# Patient Record
Sex: MALE | Race: WHITE
[De-identification: names, ages, dates, MRNs, and addresses within clinical notes are randomized per-mention and may not be internally consistent; named-entity substitution may affect disease eponyms.]

---

## 2018-09-13 ENCOUNTER — HOSPITAL ENCOUNTER (INPATIENT)
Dept: HOSPITAL 68 - ERH | Age: 63
LOS: 5 days | DRG: 426 | End: 2018-09-18
Attending: INTERNAL MEDICINE | Admitting: INTERNAL MEDICINE
Payer: COMMERCIAL

## 2018-09-13 VITALS — WEIGHT: 196 LBS | HEIGHT: 68 IN | BODY MASS INDEX: 29.7 KG/M2

## 2018-09-13 VITALS — SYSTOLIC BLOOD PRESSURE: 140 MMHG | DIASTOLIC BLOOD PRESSURE: 72 MMHG

## 2018-09-13 VITALS — SYSTOLIC BLOOD PRESSURE: 105 MMHG | DIASTOLIC BLOOD PRESSURE: 64 MMHG

## 2018-09-13 VITALS — DIASTOLIC BLOOD PRESSURE: 70 MMHG | SYSTOLIC BLOOD PRESSURE: 124 MMHG
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
## 2018-09-13 DIAGNOSIS — F05: ICD-10-CM

## 2018-09-13 DIAGNOSIS — D72.828: ICD-10-CM

## 2018-09-13 DIAGNOSIS — I10: ICD-10-CM

## 2018-09-13 DIAGNOSIS — F10.10: ICD-10-CM

## 2018-09-13 DIAGNOSIS — T50.3X5A: ICD-10-CM
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
## 2018-09-13 DIAGNOSIS — E66.9: ICD-10-CM

## 2018-09-13 DIAGNOSIS — E87.1: Primary | ICD-10-CM

## 2018-09-13 DIAGNOSIS — G89.29: ICD-10-CM

## 2018-09-13 DIAGNOSIS — F32.9: ICD-10-CM

## 2018-09-13 DIAGNOSIS — F11.20: ICD-10-CM

## 2018-09-13 DIAGNOSIS — E80.6: ICD-10-CM

## 2018-09-13 LAB
ABSOLUTE GRANULOCYTE CT: 13.8 /CUMM (ref 1.4–6.5)
BASOPHILS # BLD: 0 /CUMM (ref 0–0.2)
BASOPHILS NFR BLD: 0.1 % (ref 0–2)
EOSINOPHIL # BLD: 0 /CUMM (ref 0–0.7)
EOSINOPHIL NFR BLD: 0 % (ref 0–5)
ERYTHROCYTE [DISTWIDTH] IN BLOOD BY AUTOMATED COUNT: 12.7 % (ref 11.5–14.5)
GRANULOCYTES NFR BLD: 78.9 % (ref 42.2–75.2)
HCT VFR BLD CALC: 39.1 % (ref 42–52)
LYMPHOCYTES # BLD: 2 /CUMM (ref 1.2–3.4)
MCH RBC QN AUTO: 30.6 PG (ref 27–31)
MCHC RBC AUTO-ENTMCNC: 34.8 G/DL (ref 33–37)
MCV RBC AUTO: 88 FL (ref 80–94)
MONOCYTES # BLD: 1.6 /CUMM (ref 0.1–0.6)
PLATELET # BLD: 347 /CUMM (ref 130–400)
PMV BLD AUTO: 8.3 FL (ref 7.4–10.4)
RED BLOOD CELL CT: 4.44 /CUMM (ref 4.7–6.1)
WBC # BLD AUTO: 17.5 /CUMM (ref 4.8–10.8)

## 2018-09-13 PROCEDURE — 84133 ASSAY OF URINE POTASSIUM: CPT

## 2018-09-13 PROCEDURE — G0480 DRUG TEST DEF 1-7 CLASSES: HCPCS

## 2018-09-13 PROCEDURE — 84300 ASSAY OF URINE SODIUM: CPT

## 2018-09-13 NOTE — ED AMS/SEIZURE/WEAK/DIZZY
**See Addendum**
History of Present Illness
 
General
Chief Complaint: Altered Mental Status
Stated Complaint: BIBA WITH AMS
Source: EMS
Exam Limitations: unable to give history, confusion
 
Vital Signs & Intake/Output
Vital Signs & Intake/Output
 Vital Signs
 
 
Date Time Temp Pulse Resp B/P B/P Pulse O2 O2 Flow FiO2
 
     Mean Ox Delivery Rate 
 
09/13 2001 97.5 77 18 120/74  99 Room Air  
 
09/13 1757 97.2 62 20 118/56  98 Room Air  
 
09/13 1603 95.0 98 20 192/102  99 Room Air  
 
 
 
Allergies
Coded Allergies:
No Known Allergies (09/13/18)
 
Triage Note:
PT BIBA FROM [facility]. PT WAS FOUND
 WALKING WITHOUT SHOES AND DISORIENTED. PT
 REMAINED DISORIENTED TO SITUATION. PT ABLE TO
 STATE YEAR AND DATE BUT THOUGHT HE WAS AT HOSPITAL
 TO GET "PIZZA". PT ALSO STATED HE WANTED TO LEAVE
 BECAUSE HE WAS AT THE HOSPITAL FOR "HOURS" BUT WAS
 ONLY PRESENT FOR A FEW MINUTES. PT HYPERTENSIVE.
 PT'S SPEECH CLEAR AND APPROPRIATE.  STRENGTH
 EQUAL BILAT.
Triage Nurses Notes Reviewed? yes
Unable To Obtain Hx Due To: patient confusion
HPI:
62-year-old male with past medical history of hypertension, opioid abuse brought
in by EMS after he was found altered wandering around a grocery store with no 
shoes.  Patient is A&O 3 however he remains confused about the days events and 
is fighting staff.  He believes he has been in the hospital for a very long time
and wants to go home.  He reports that he lives with his mother in [place].  He
gave me permission to contact her and a phone number which is not a current or 
valid phone number.  He reports that he is on methadone 25 mg daily which he 
obtains from [facility].  He states he did not go to  his medication 
today.  He also states that he takes metoprolol for hypertension but has not 
taken that medication either.  Patient is clearly altered and history is 
unreliable at this time.  Patient has not been seen in [facility] before 
and no prior records are able to be obtained at this time.
(Sarath DAVILA,Jeannine)
Reconcile Medications
Aspirin/Acetaminophen/Caffeine (Excedrin Extra Strength Caplet) 250 MG-250 MG-65
MG TABLET HEADACHES  (Reported)
Bupropion HCl (Bupropion XL) 300 MG TAB.ER.24H   1 TAB PO DAILY MENTAL HEALTH  (
Reported)
Finasteride 5 MG TABLET   1 TAB PO DAILY PROSTATE  (Reported)
Metoprolol Succ XL (Toprol XL) 100 MG TAB.ER.24H   1 TAB PO DAILY HEART/BP  (
Reported)
Naproxen Sodium (Aleve) 220 MG TABLET PAIN/INFLAMMATION  (Reported)
Simethicone (Gas-X) 125 MG CAPSULE   1 CAP PO AD PRN GAS  (Reported)
Tamsulosin HCl (Flomax) 0.4 MG CAP.ER.24H   1 CAP PO DAILY /PROSTATE  (
Reported)
 
(Tong Sun DO)
 
Past History
 
Travel History
Traveled to [place] past 21 day No
 
Medical History
Neurological: NONE
EENT: NONE
Cardiovascular: hypertension
Respiratory: NONE
Gastrointestinal: NONE
Hepatic: NONE
Renal: NONE
Musculoskeletal: NONE
Psychiatric: substance abuse
Endocrine: NONE
Blood Disorders: NONE
Cancer(s): NONE
 
Psychosocial History
Who do you live with Mother
Services at Home None
What is your primary language English
Tobacco Use: Quit >30 days ago
ETOH Use: denies use
Illicit Drug Use: denies illicit drug use
 
Family History
Hx Contributory? No
(Jeannine Clemens MD)
 
Medical History
Any Pertinent Medical History? see below for history
 
Surgical History
Surgical History: unobtainable
(Tong Sun DO)
 
Review of Systems
 
Review of Systems
Constitutional:
Reports: no symptoms. 
EENTM:
Reports: see HPI. 
Respiratory:
Reports: see HPI. 
Cardiovascular:
Reports: see HPI. 
GI:
Reports: see HPI. 
Genitourinary:
Reports: see HPI. 
Musculoskeletal:
Reports: see HPI. 
Skin:
Reports: see HPI. 
Neurological/Psychological:
Reports: see HPI. 
(Jeannine Clemens MD)
 
Physical Exam
 
Physical Exam
General Appearance: alert, awake, anxious, severe distress, obese, patient in 
four point restraints as he was agitated and unwilling to stay in bed or room.  
He would not follow commands of staff.
Head: atraumatic, normal appearance
Eyes:
Bilateral: normal appearance. 
Neck: normal inspection, supple
Respiratory: normal breath sounds, chest non-tender, no respiratory distress
Cardiovascular: regular rate/rhythm
Gastrointestinal: normal bowel sounds, soft, non-tender
Extremities: pedal edema
Skin: warm/dry
(Jeannine Clemens MD)
 
Core Measures
ACS in differential dx? No
CVA/TIA Diagnosis No
Sepsis Present: No
Sepsis Focused Exam Completed? No
(Dino DO,Tong L.)
 
Progress
Differential Diagnosis: alcohol intoxication, dehydration, drug intoxication, 
hypoxia, intracranial Hem., UTI/pyelo
Plan of Care:
 Orders
 
 
Procedure Date/time Status
 
Nothing by Mouth 09/14 B Active
 
VRE ACTIVE SURVIELLANCE 09/13 2030 Active
 
ACTIVE SURVEILLANCE NARES 09/13 2030 Active
 
Pathway - chart 09/13 2026 Active
 
House Staff 09/13 2026 Active
 
Code Status 09/13 2026 Active
 
Pathway - chart 09/13 1957 Active
 
Weight 09/13 1943 Active
 
Vital Signs 09/13 1943 Active
 
Turn and Reposition 09/13 1943 Active
 
Teach/Educate 09/13 1943 Active
 
Skin Integrity Protocol 09/13 1943 Active
 
Skin/Pressure Ulcer Assess (Sk 09/13 1943 Active
 
Precautions 09/13 1943 Active
 
Pain Treatment and Response 09/13 1943 Active
 
Nutritional Intake, Monitor 09/13 1943 Active
 
Isolation 09/13 1943 Active
 
CIWA 09/13 1943 Active
 
Patient Care Conference 09/13 1943 Active
 
Activity/Ambulation 09/13 1943 Active
 
SODIUM 09/13 1929 Complete
 
URINE DRUGS OF ABUSE 09/13 1912 Complete
 
Restraint- Discontinue 09/13 1900 Active
 
Restraint- Behavioral (Order) 09/13 1817 Active
 
Patient Data 09/13 1813 Active
 
Add-on Test (ER Only) 09/13 1748 Active
 
CULTURE,URINE 09/13 1748 Active
 
URINE OSMOLALITY 09/13 1748 Complete
 
URINE LYTES, SPOT 09/13 1748 Complete
 
URINALYSIS 09/13 1748 Complete
 
BLOOD CULTURE 09/13 1747 Active
 
ED Holding Orders 09/13 1745 Active
 
Admit to inpatient 09/13 1745 Active
 
Vital Signs 09/13 1745 Active
 
Code Status 09/13 1745 Complete
 
Restraint- Behavioral (Order) 09/13 1637 Active
 
ACETOMINOPHEN 09/13 1635 Complete
 
TROPONIN LEVEL 09/13 1635 Complete
 
SALICYLATE 09/13 1635 Complete
 
ETHANOL 09/13 1635 Complete
 
COMPREHENSIVE METABOLIC PANEL 09/13 1635 Complete
 
CBC WITHOUT DIFFERENTIAL 09/13 1635 Complete
 
EKG 09/13 1635 Active
 
Intake & Output 09/13 1557 Active
 
VTE Mechanical Prophylaxis 09/13  UNK Active
 
Vital Signs 09/13  UNK Active
 
Continuous Observation Monitor 09/13  UNK Active
 
 
 Current Medications
 
 
  Sig/Yoshi Start time  Last
 
Medication Dose  Stop Time Status Admin
 
Sodium Chloride 1,000 ML .Q24H 09/13 2045 CAN 
 
(Normal Saline 0.9%)     
 
Sodium Chloride 1,000 ML .Q24H 09/13 2030 AC 
 
(Normal Saline 0.9%)     
 
Lorazepam 2 MG ONE ONE 09/13 1715 CAN 
 
(Ativan)   09/13 1716  
 
 
 Laboratory Tests
 
 
 
09/13/18 1932:
 
 
09/13/18 1912:
Urine Opiates Screen 2377.00  H, Methadone Screen > 735  H, Barbiturate Screen <
60, Ur Phencyclidine Scrn < 6.00, Amphetamines Screen < 100, U Benzodiazepines 
Scrn < 85, Urine Cocaine Screen < 50, Urine Cannabis Screen < 5.00, Urine Color 
YEL, Urine Clarity CLEAR, Urine pH 7.0, Ur Specific Gravity 1.010, Urine Protein
NEG, Urine Ketones TRACE  H, Urine Nitrite NEG, Urine Bilirubin NEG, Urine 
Urobilinogen 0.2, Ur Leukocyte Esterase NEG, Ur Microscopic SEDIMENT EXAMINED, 
Urine RBC RARE, Urine WBC RARE, Ur Epithelial Cells RARE, Urine Mucus RARE, 
Urine Hemoglobin TRACE-INTACT  H, Urine Glucose NEG
 
09/13/18 1912:
Methadone Screen Cancelled, Barbiturate Screen Cancelled, Ur Phencyclidine Scrn 
Cancelled, Amphetamines Screen Cancelled, U Benzodiazepines Scrn Cancelled, 
Urine Cocaine Screen Cancelled, Urine Cannabis Screen Cancelled, Urine 
Osmolality 218  L, Ur Random Creatinine 30.7, Ur Random Sodium 30, Ur Random 
Potassium 24.9, Fraction Sodium Excret 0.5
 
09/13/18 1641:
Anion Gap 11, Estimated GFR > 60, BUN/Creatinine Ratio 18.3, Glucose 96, Calcium
9.0, Total Bilirubin 1.6  H, AST 79  H, ALT 46, Alkaline Phosphatase 73, 
Troponin I < 0.01, Total Protein 6.9, Albumin 4.3, Globulin 2.6, Albumin/
Globulin Ratio 1.7, CBC w Diff NO MAN DIFF REQ, RBC 4.44  L, MCV 88.0, MCH 30.6,
MCHC 34.8, RDW 12.7, MPV 8.3, Gran % 78.9  H, Lymphocytes % 11.6  L, Monocytes %
9.4  H, Eosinophils % 0, Basophils % 0.1, Absolute Granulocytes 13.8  H, 
Absolute Lymphocytes 2.0, Absolute Monocytes 1.6  H, Absolute Eosinophils 0, 
Absolute Basophils 0, Salicylates 3.0, Acetaminophen < 10.0  L, Serum Alcohol < 
10.0
 Microbiology
09/13 2030  UPPER RESP: Surveillance Culture - ORD
09/13 2030  GI: Surveillance Culture - ORD
09/13 1912  URINE ROUT: Urine Culture - RECD
09/13 1800  BLOOD: Blood Culture - RECD
09/13 1746  BLOOD: Blood Culture - RECD
 
 
Comments:
62 year old male found altered outside a grocery store barefoot.  Will order the
following:
CBC, CMP, ASA level, Tylenol level, EKG, trop, Utox, Etoh level.  Patient in 4 
point restraints placed at 16:05.  Will need to sedate to obtain CT head to rule
out acute head injury.  No contacts available or known at this time to obtain 
history.
(Jeannine Clemens MD)
Initial ED EKG: NSR
(Tong Sun DO)
 
Departure
 
Departure
Condition: Stable
Departure Forms:
Customer Survey
General Discharge Information
(Sarath DAVILA,Jeannine)
 
Departure
Disposition: STILL A PATIENT
Clinical Impression
Primary Impression: Hyponatremia
Secondary Impressions: Altered mental status
 
Admission Note
Spoke With:
Solo DAVILA,Hermila
Documentation of Exam:
Documentation of any treatments & extenuating circumstances including Concerns 
Regarding Discharge (functional status, medication knowledge or non-compliance, 
living conditions, etc.) that warrant an admission rather than observation: [The
patient needs admission for neurological evaluations every 4 hours, ICU level 
care, strict I's and O's, correction of hyponatremia, nephrology consultation.  
Nephrology was paged and informed that the patient was being admitted to the 
ICU.
 
(Tong Sun DO)

## 2018-09-13 NOTE — CT SCAN REPORT
EXAMINATION:
CT HEAD WITHOUT CONTRAST
 
CLINICAL INFORMATION:
Altered mental status.
 
COMPARISON:
No relevant prior imaging.
 
TECHNIQUE:
Contiguous axial imaging was performed from the skull base to vertex without
intravenous administration of contrast.
 
DLP:
1642.88 mGy-cm
 
FINDINGS:
There is no acute intracranial hemorrhage or abnormal extra-axial collection.
No intracranial mass effect or midline shift. Lateral and third ventricles
are proportionate to the subarachnoid spaces. No hydrocephalus. Gray-white
matter differentiation is grossly preserved and there is no evidence of acute
territorial infarct. The calvarium and skull base are intact. Mastoid air
cells and middle ear cavities are well aerated. Visualized paranasal sinuses
are well aerated.
 
IMPRESSION:
Unremarkable brain MRI. No evidence acute territorial infarct or hemorrhage.

## 2018-09-13 NOTE — HISTORY & PHYSICAL
Dez Gómez 09/13/18 1825:
General Information and HPI
MD Statement:
I have seen and personally examined SKY DAVISON and documented this H&P.
 
The patient is a 62 year old M who presented with a patient stated chief 
complaint of [altered mental status].
 
Source of Information: old records, EMS, ed staff
Exam Limitations: unable to give history, poor historian
History of Present Illness:
62-year-old gentleman past medical history significant for hypertension, opioid 
abuse brought in by ambulance after police was called when he was found 
wandering around a grocery store without any shoes on. Patient has not been seen
in [facility] before and no prior records are able to be obtained at this 
time.
 
On my interview interview patient was incomprehensible however was noted to be 
agitated as well.
 
Allergies/Medications
Allergies:
Coded Allergies:
No Known Allergies (09/13/18)
 
Home Med list
Aspirin/Acetaminophen/Caffeine (Excedrin Extra Strength Caplet) 250 MG-250 MG-65
MG TABLET HEADACHES  (Reported)
Bupropion HCl (Bupropion XL) 300 MG TAB.ER.24H   1 TAB PO DAILY MENTAL HEALTH  (
Reported)
Finasteride 5 MG TABLET   1 TAB PO DAILY PROSTATE  (Reported)
Metoprolol Succ XL (Toprol XL) 100 MG TAB.ER.24H   1 TAB PO DAILY HEART/BP  (
Reported)
Naproxen Sodium (Aleve) 220 MG TABLET PAIN/INFLAMMATION  (Reported)
Simethicone (Gas-X) 125 MG CAPSULE   1 CAP PO AD PRN GAS  (Reported)
Tamsulosin HCl (Flomax) 0.4 MG CAP.ER.24H   1 CAP PO DAILY /PROSTATE  (
Reported)
 
Compliance With Home Meds: UNKNOWN
 
Past History
 
Travel History
Traveled to [place] past 21 day No
 
Medical History
Neurological: NONE
EENT: NONE
Cardiovascular: hypertension
Respiratory: NONE
Gastrointestinal: NONE
Hepatic: NONE
Renal: NONE
Musculoskeletal: NONE
Psychiatric: substance abuse
Endocrine: NONE
Blood Disorders: NONE
Cancer(s): NONE
 
Surgical History
Surgical History: unobtainable
 
Past Family/Social History
 
Psychosocial History
Services at Home: None
ETOH Use: denies use
Illicit Drug Use: denies illicit drug use
 
Review of Systems
 
Review of Systems
Constitutional:
Denies: see HPI. 
 
Exam & Diagnostic Data
Last 24 Hrs of Vital Signs/I&O
 Vital Signs
 
 
Date Time Temp Pulse Resp B/P B/P Pulse O2 O2 Flow FiO2
 
     Mean Ox Delivery Rate 
 
09/13 2001 97.5 77 18 120/74  99 Room Air  
 
09/13 1757 97.2 62 20 118/56  98 Room Air  
 
09/13 1603 95.0 98 20 192/102  99 Room Air  
 
 
 Intake & Output
 
 
 09/13 1600 09/13 0800 09/13 0000
 
Intake Total   
 
Output Total   
 
Balance   
 
    
 
Patient 220 lb  
 
Weight   
 
 
 
 
Physical Exam
General Appearance Mild Distress, uncooperative, unkept
Skin bruising noted on arms, ? track marks on right cubital area
HEENT PERRLA, Mucous Membr. moist/pink, small pupils
Lymphatic Cervical nl
Cardiovascular Regular Rate, Normal S1, Normal S2
Lungs Normal Air Movement
Abdomen Normal Bowel Sounds, Soft, No Tenderness
Extremities No Edema
Last 24 Hrs of Labs/South:
 Laboratory Tests
 
09/13/18 1932:
 
 
09/13/18 1912:
Urine Opiates Screen 2377.00  H, Methadone Screen > 735  H, Barbiturate Screen <
60, Ur Phencyclidine Scrn < 6.00, Amphetamines Screen < 100, U Benzodiazepines 
Scrn < 85, Urine Cocaine Screen < 50, Urine Cannabis Screen < 5.00, Urine Color 
YEL, Urine Clarity CLEAR, Urine pH 7.0, Ur Specific Gravity 1.010, Urine Protein
NEG, Urine Ketones TRACE  H, Urine Nitrite NEG, Urine Bilirubin NEG, Urine 
Urobilinogen 0.2, Ur Leukocyte Esterase NEG, Ur Microscopic SEDIMENT EXAMINED, 
Urine RBC RARE, Urine WBC RARE, Ur Epithelial Cells RARE, Urine Mucus RARE, 
Urine Hemoglobin TRACE-INTACT  H, Urine Glucose NEG
 
09/13/18 1912:
Methadone Screen Cancelled, Barbiturate Screen Cancelled, Ur Phencyclidine Scrn 
Cancelled, Amphetamines Screen Cancelled, U Benzodiazepines Scrn Cancelled, 
Urine Cocaine Screen Cancelled, Urine Cannabis Screen Cancelled, Urine 
Osmolality 218  L, Ur Random Creatinine 30.7, Ur Random Sodium 30, Ur Random 
Potassium 24.9, Fraction Sodium Excret 0.5
 
09/13/18 1641:
Anion Gap 11, Estimated GFR > 60, BUN/Creatinine Ratio 18.3, Glucose 96, Calcium
9.0, Total Bilirubin 1.6  H, AST 79  H, ALT 46, Alkaline Phosphatase 73, 
Troponin I < 0.01, Total Protein 6.9, Albumin 4.3, Globulin 2.6, Albumin/
Globulin Ratio 1.7, CBC w Diff NO MAN DIFF REQ, RBC 4.44  L, MCV 88.0, MCH 30.6,
MCHC 34.8, RDW 12.7, MPV 8.3, Gran % 78.9  H, Lymphocytes % 11.6  L, Monocytes %
9.4  H, Eosinophils % 0, Basophils % 0.1, Absolute Granulocytes 13.8  H, 
Absolute Lymphocytes 2.0, Absolute Monocytes 1.6  H, Absolute Eosinophils 0, 
Absolute Basophils 0, Salicylates 3.0, Acetaminophen < 10.0  L, Serum Alcohol < 
10.0
 Microbiology
09/13 2030  UPPER RESP: Surveillance Culture - ORD
09/13 2030  GI: Surveillance Culture - ORD
09/13 1912  URINE ROUT: Urine Culture - RECD
09/13 1800  BLOOD: Blood Culture - RECD
09/13 1746  BLOOD: Blood Culture - RECD
 
 
 
Diagnostic Data
EKG Results
NSR HR 64, QTc 454
type 1 HB
CXR Results
SERVICE DATE: 09/13/
EXAM TYPE: RAD - XRY-PORTABLE CHEST XRAY
FINDINGS:
Both lungs are well-expanded and clear of acute process. The heart size and
pulmonary vascularity is normal. No gross bony abnormality seen.
 
IMPRESSION:
Unremarkable chest exam.
 
Other Results
SERVICE DATE: 09/13/
EXAM TYPE: CAT - CT HEAD WO IV CONTRAST
 
FINDINGS:
There is no acute intracranial hemorrhage or abnormal extra-axial collection.
No intracranial mass effect or midline shift. Lateral and third ventricles
are proportionate to the subarachnoid spaces. No hydrocephalus. Gray-white
matter differentiation is grossly preserved and there is no evidence of acute
territorial infarct. The calvarium and skull base are intact. Mastoid air
cells and middle ear cavities are well aerated. Visualized paranasal sinuses
are well aerated.
 
IMPRESSION:
Unremarkable brain MRI. No evidence acute territorial infarct or hemorrhage.
 
 
Assessment/Plan
Assessment:
62-year-old gentleman past medical history significant for hypertension, 
substance abuse disorder on methadone brought in altered by police.  In ED was 
found to be hyponatremic to 118 with leukocytosis of 17.5 with no bandemia.  UA 
clear.  U tox significant for opioids and methadone. 
CT head shows no acute intracranial hemorrhage or abnormal extra-axial 
collection. No intracranial mass effect or midline shift.  Chest x-ray 
unremarkable
 
Problem list:
hypotonic Hyponatremia
Altered mental status
Leukocytosis, unclear etiology likely reactive, currently afebrile
Substance abuse disorder
 
 
 
Plan:
Admit to ICU, vitals per protocol will try to increase sodium by 0.5 mL/h (4-6/3
hours)
Keep n.p.o. for now holding all his medications, please reassess in the morning
There is no next of kin contact for him in our system
Initially wanted to start the patient on hypertonic solution.  However patient 
got normal saline in the ED his sodium corrected to 122.  Will start normal 
saline KVO, once he is more awake will place him on 800 mL fluid restriction
Clinically looks euvolemic,  serum osmolality 259, with low urine osmolality 218
, urine sodium 30, feNa of 0.5, hypouricemia, hypotonic hyponatremia likely 
secondary to SIADH ,review of his medications show no clear culprit. There has 
been some case reports of bupropion causing hyponatremia.
Will hold all medications for now, please confirm his medications in the morning
Regarding his leukocytosis patient is afebrile right now will follow up with 
antibiotics if he spikes a fever will cover him empirically.
DVT prophylaxis subcu Lovenox
Patient is full code please reassess CODE STATUS when he is more awake
 
As Ranked By This Provider
Problem List:
 1. Hyponatremia
 
 2. Altered mental status
 
 
Core Measures/Misc (9/17)
 
Acute Coronary Syndrome
ACS Diagnosis: No
 
Congestive Heart Failure
Congestive Heart Failure Diagnosis No
 
Cerebrovascular Accident
CVA/TIA Diagnosis: No
 
VTE (View Protocol)
VTE Risk Factors Acute Medical Illness
No Mechanical VTE Prophylaxis d/t N/A MechProphylax Ordered
No VTE Pharm Prophylaxis d/t NA PharmProphylax ordered
 
Sepsis (View protocol)
Sepsis Present: No
If YES complete Sepsis Event Note If YES complete Sepsis Event Note
 
 
Martita DAVILA,MIKA Boyd 09/13/18 1852:
Core Measures/Misc (9/17)
 
Sepsis (View protocol)
If YES complete Sepsis Event Note If YES complete Sepsis Event Note
 
Attending MD Review Statement
 
Attending Statement
Attending MD Statement: examined this patient, discuss w/resident/PA/NP, agreed 
w/resident/PA/NP, reviewed EMR data (avail), reviewed images, amended to note
Attending Assessment/Plan:
Briefly, the patient is a 62-year-old male with past medical history significant
for hypertension and opioid abuse.  The patient was brought in by EMS after he 
was found altered wandering in a store with no shoes.  The patient was agitated 
in the ED and required restraints.  In the ED, the patient was not able to give 
a clear history.  It was reported that he takes methadone 25 mg daily which he 
obtains from [facility] but had not gone to take his medication today. 
He also takes metoprolol for hypertension but had not taken that medication 
either.  The patient was further evaluated in the emergency room, noting he had 
a white blood cell count of 17,000 and a serum sodium of 118.  His total 
bilirubin was 1.6.  The patient was initially hypertensive which readily 
improved.  The patient was very agitated and required restraint, noting he was 
given Ativan and Benadryl with improvement in his agitation.  A chest x-ray was 
done and was unremarkable.  The patient's case was discussed with nephrology.  
He is currently receiving hypertonic saline.  The cause of his hyponatremia 
remains unclear, and the complete workup as recommended by nephrology is 
pending.  The patient has been planned cultured.  A urinalysis has been ordered 
and is pending.  We will start the patient on antibiotics if there is any source
of infection identified.
 
 
 
Plan:
* Hypertonic saline administration as recommended by nephrology.
* Will replace serum sodium until CNS symptoms improve.
* Electrolytes will be monitored every 4 hours, we will avoid rapid correction 
of sodium.
* Follow-up urinalysis results.
* Follow-up cultures.
* Will start empiric antibiotics if the urinalysis is abnormal or if there is 
any evidence of infection.
* Check a right upper quadrant ultrasound.
* Please ensure the toxicology screen is completed.
* Check a head CT if able.
* Check a prolactin level.
* Monitor for seizure activity/neurological events.
* DVT prophylaxis with Venodyne's at all times until head CT is checked.
* Monitor closely in the critical care unit.
* I discussed the plan of care with the housestaff in detail.

## 2018-09-14 VITALS — DIASTOLIC BLOOD PRESSURE: 70 MMHG | SYSTOLIC BLOOD PRESSURE: 130 MMHG

## 2018-09-14 VITALS — SYSTOLIC BLOOD PRESSURE: 141 MMHG | DIASTOLIC BLOOD PRESSURE: 64 MMHG

## 2018-09-14 VITALS — SYSTOLIC BLOOD PRESSURE: 136 MMHG | DIASTOLIC BLOOD PRESSURE: 81 MMHG

## 2018-09-14 VITALS — SYSTOLIC BLOOD PRESSURE: 124 MMHG | DIASTOLIC BLOOD PRESSURE: 70 MMHG

## 2018-09-14 VITALS — SYSTOLIC BLOOD PRESSURE: 120 MMHG | DIASTOLIC BLOOD PRESSURE: 76 MMHG

## 2018-09-14 VITALS — DIASTOLIC BLOOD PRESSURE: 80 MMHG | SYSTOLIC BLOOD PRESSURE: 146 MMHG

## 2018-09-14 VITALS — SYSTOLIC BLOOD PRESSURE: 138 MMHG | DIASTOLIC BLOOD PRESSURE: 82 MMHG

## 2018-09-14 VITALS — DIASTOLIC BLOOD PRESSURE: 78 MMHG | SYSTOLIC BLOOD PRESSURE: 155 MMHG

## 2018-09-14 LAB
ABSOLUTE GRANULOCYTE CT: 8.1 /CUMM (ref 1.4–6.5)
BASOPHILS # BLD: 0.1 /CUMM (ref 0–0.2)
BASOPHILS NFR BLD: 0.4 % (ref 0–2)
EOSINOPHIL # BLD: 0 /CUMM (ref 0–0.7)
EOSINOPHIL NFR BLD: 0.2 % (ref 0–5)
ERYTHROCYTE [DISTWIDTH] IN BLOOD BY AUTOMATED COUNT: 12.9 % (ref 11.5–14.5)
GRANULOCYTES NFR BLD: 69.5 % (ref 42.2–75.2)
HCT VFR BLD CALC: 39 % (ref 42–52)
LYMPHOCYTES # BLD: 1.9 /CUMM (ref 1.2–3.4)
MCH RBC QN AUTO: 30.5 PG (ref 27–31)
MCHC RBC AUTO-ENTMCNC: 34.1 G/DL (ref 33–37)
MCV RBC AUTO: 89.5 FL (ref 80–94)
MONOCYTES # BLD: 1.5 /CUMM (ref 0.1–0.6)
PLATELET # BLD: 303 /CUMM (ref 130–400)
PMV BLD AUTO: 7.9 FL (ref 7.4–10.4)
RED BLOOD CELL CT: 4.36 /CUMM (ref 4.7–6.1)
WBC # BLD AUTO: 11.7 /CUMM (ref 4.8–10.8)

## 2018-09-14 NOTE — EVENT NOTE
Event Note
Event Note:
[facility] apparently is estranged from his brother, he does not know his contact 
information
Patient's therapist is Tara, located in [place]  ph: [phone]

## 2018-09-14 NOTE — ADMISSION CERTIFICATION
Admission Certification
 
Certification Statement
- As attending physician, I certify that at the time of
- admission, based on clinical presentation, severity of
- symptoms, need for further diagnostic testing and
- therapeutic interventions, and risk of adverse outcomes
- without in-hospital treatment, in my clinical assessment,
- this patient requires an acute hospital stay for a minimum
- of two nights or longer. I have also considered psychsocial
- factors such as support system, advanced age, financial
- issues, cognitive issues, and failed out-patient treatments,
- past re-admission history, safety of patient, and lack of
- compliance as applicable.
Specific rationale supporting this admission is:
Acute mental status change and confusion, severe hyponatremia which requires 
hypertonic saline, IV fluid management, and careful electrolyte management.  The
patient will require ICU monitoring for his critical illness.

## 2018-09-14 NOTE — PN- RESIDENT CRCU
Subjective
HPI/CRCU Issues:
Hyponatremia (118 on admission) s/p hypertonic saline
 
24 Hour Events:
Overnight patient was agitated, attempted to leave; order #7 was called and 
patient was escorted back to bed, 1:1 sitter ordered. Was in restraints in ED, 
off restraints at this time. Patient has poor insight into diagnosis, continues 
to ask why he is here and saying he wants to leave. States that he was in 
[facility] in the beginning of the week for "appendicitis" although did
not have an operation, was "unable to eat or drink anything" for two days and 
was discharged on Tuesday without follow up or medications. States that last 
night he was in a hotel with his girlfriend and apparently she wanted pizza, so 
he went out in his boxers without shoes on to get pizza. Unsure as to why police
found him rummaging through cars, states that "that never happened". Per 
discussion with nursing, patient states that "I was not rummaging through cars, 
I just got into the wrong subaru". Patient states that he feels as though his 
sodium is fine and continues to ask to leave, educated on risks of hyponatremia 
and states he will stay. 
 
His sodium has increased from 118-128, and was placed on D5W to avoid 
overcorrection.
 
Objective
 
Vital Signs & I&O
Last 8 Hrs of Vitals and I&O:
 Intake & Output
 
 
  0800
 
Intake Total 179
 
Output Total 1000
 
Balance -821
 
  
 
Intake, IV 59
 
Intake, Oral 120
 
Number 0
 
Bowel 
 
Movements 
 
Output, Urine 1000
 
 
 
 
Exam
General Appearance: well developed/nourished, no apparent distress, alert, awake
, comfortable
Head: atraumatic
Ears, Nose, Throat: normal pharynx
Respiratory: normal breath sounds, lungs clear
Cardiovascular: regular rate/rhythm, normal peripheral pulses
Gastrointestinal: soft, non-tender
Extremities: normal inspection
Cranial Nerves: normal speech
Skin: intact
Skin Temp/Moisture Exam: Warm/Dry
Sepsis Skin Exam (color): Normal for Ethnicity
Current Medications:
 Current Medications
 
 
  Sig/Yoshi Start time  Last
 
Medication Dose Route Stop Time Status Admin
 
Dextrose/Water 1,000 ML Q8H  0730 AC 
 
  IV   0750
 
Diphenhydramine HCl 50 MG ONCE ONE  1730 DC 
 
  IM  1731  1732
 
Diphenhydramine HCl 0 .STK-MED ONE  1729 DC 
 
  .ROUTE   
 
Lorazepam 2 MG ONE ONE  1730 DC 
 
  IV  1731  1732
 
Lorazepam 2 MG ONE ONE  1715 CAN 
 
  IV  1716  
 
Lorazepam 0 .STK-MED ONE  1713 DC 
 
  .ROUTE   
 
Methadone HCl 25 MG DAILY  09 AC 
 
  PO   1000
 
Sodium Chloride 1,000 ML .Q24H  2045 CAN 
 
  IV   
 
Sodium Chloride 1,000 ML .Q24H  2030 DC 
 
  IV   2100
 
Sodium Chloride 500 ML ONCE ONE  1845 DC 
 
  IV  0059  1945
 
Sodium Chloride 1,000 ML Q10H  1715 DC 
 
  IV   1732
 
 
 
 
Impression/Plan
 
Impression/Problem List
Impression:
Mr Sood is a 62M with a PMH of polysubstance abuse on Methadone 25mg daily, 
HTN brought in by police in altered state, found to have sodium of 118 on 
admission and confusion and aggression, given these findings thought to be 
symptomatic hyponatremia and so brought to ICU for hypertonic saline 
administration, now with sodium of 128 on D5W to prevent overcorrection.
 
Respiratory: stable
-Stable on room air
 
Infectious Disease:
-Did have leukocytosis on admission trending down, afebrile, likely reactive
-Will monitor off abx; cultures were drawn on admission 
-If febrile or symptomatic will start abx
 
Cardiovascular/Circulation: stable
-NSR+SB on monitor, blood pressures stable
 
Hematological: stable
-H/H 13.3/39.0
 
Metabolic: hypotonic hyponatremia
-Sodium at 131 at 10:00am, D5W rate increased
-Pending 2pm sodium, sodium q4
-Nephro recs appreciated
-Mild transaminitis will monitor with ICU bundle, scheduled for RUQ ultrasound 
but cancelled due to patient eating; will plan on tomorrow AM potential NPO 
overnight
-Tox positive for opiates, methadone, serum alcohol negative on admission
-Restarted on his methadone dose 25mg, called [facility] in 
[place] and spoke with patients nurse who administers his dose; last dose was
on the . Patient had claimed he was taking 45mg of methadone.
-Per [place], patient has large amount of benzos prescribed (Lorazepam 2mg 120 
pills for 30 days), will monitor for withdrawal
 
 
Alimentary:
-Regular diet, fluid restricted
 
 
Neurologic:
-Intact, on 1:1 sitter
-Occasionally agitated, but has not been on restraints today
-Psych consult placed given bizarre patient behavior and patient following 
outpatient psychiatrist, tianna dickson
 
Nephro:
-Stable
 
We are currently holding patient meds, will call patients pharmacy to confirm 
meds and restart tomorrow.
 
 
DVT PPX
IV access
Full Code
Problem List:
 1. Hyponatremia
 
 2. Altered mental status
 
Pain Ratin
Tomorrow's Labs & Rationales:
CBC, BEP
 
Plan
DVT/Prophylaxis: mechanical

## 2018-09-14 NOTE — CONS- NEPHROLOGY
General Information and HPI
 
Consulting Request
Date of Consult: 09/14/18
Requested By:
MIKA Anders MD
 
Reason for Consult:
Hyponatremia
Source of Information: patient
Exam Limitations: no limitations
History of Present Illness:
 
I have been asked to see this 62-year-old man because of hyponatremia.  He has a
background that includes hypertension and opioid abuse, and was brought in 
yesterday by ambulance after being found wandering around a grocery store while 
not wearing any shoes.  He was admitted because of altered mental status that 
included agitation and confusion in the setting of severe hyponatremia with a 
serum sodium of 118.  He was treated with hypertonic saline and over the next 13
hours his serum sodium ranjan to 128.  He was then given IV D5W and subsequent 
serum sodium levels were 131 and then 130.  In the interim his mental status 
markedly improved although he remains a rather poor historian.  On reviewing his
outpatient medications, he was on bupropion 300 mg daily, methadone and 
occasional naproxen.  He was on no other medications known to cause hyponatremia
, and he denies nausea, vomiting, diarrhea, headache or visual symptoms.  He 
also denies ingesting a large amount of liquids on any regular basis.  Finally, 
he claims that he eats a regular, normal diet.
 
Past medical history is positive for hypertension and opioid abuse; he denies IV
drug abuse
 
Medications: See below
 
Allergies: No known drug allergies
 
Family history: Negative for any known kidney or endocrine disorders
 
Social history: He lives with his brother, never , no children, states 
that he is a retired , former cigarette smoker, denies alcohol or IV 
drug abuse
 
 
 
Allergies/Medications
Allergies:
Coded Allergies:
No Known Allergies (09/13/18)
 
Home Med List:
Aspirin/Acetaminophen/Caffeine (Excedrin Extra Strength Caplet) 250 MG-250 MG-65
MG TABLET HEADACHES  (Reported)
Bupropion HCl (Bupropion XL) 300 MG TAB.ER.24H   1 TAB PO DAILY MENTAL HEALTH  (
Reported)
Finasteride 5 MG TABLET   1 TAB PO DAILY PROSTATE  (Reported)
Metoprolol Succ XL (Toprol XL) 100 MG TAB.ER.24H   1 TAB PO DAILY HEART/BP  (
Reported)
Naproxen Sodium (Aleve) 220 MG TABLET PAIN/INFLAMMATION  (Reported)
Simethicone (Gas-X) 125 MG CAPSULE   1 CAP PO AD PRN GAS  (Reported)
Tamsulosin HCl (Flomax) 0.4 MG CAP.ER.24H   1 CAP PO DAILY /PROSTATE  (
Reported)
 
 
Review of Systems
Review of Systems:
 
Gen.: Appetite had been good, no unexplained weight loss or weight gain
Skin: No rash or jaundice
HEENT: No visual or hearing disturbances, no discharge
Cardiopulmonary: No shortness of breath, cough, chest pain, orthopnea
GI: No nausea, vomiting, abdominal pain, diarrhea
: No dysuria, hematuria or other symptoms referable to the urinary tract
Musculoskeletal: No arthralgias, arthritis, myalgias, weakness
Neuro: See HPI
 
 
 
Past History
 
Travel History
Traveled to [place] past 21 day No
 
Medical History
Neurological: NONE
EENT: NONE
Cardiovascular: hypertension
Respiratory: NONE
Gastrointestinal: NONE
Hepatic: NONE
Renal: NONE
Musculoskeletal: NONE
Psychiatric: substance abuse
Endocrine: NONE
Blood Disorders: NONE
Cancer(s): NONE
 
Surgical History
Surgical History: unobtainable
 
Psychosocial History
Where Do You Live? Home
Services at Home: None
Smoking Status: Unknown If Ever Smoked
ETOH Use: denies use
Illicit Drug Use: denies illicit drug use
 
Exam & Diagnostic Data
Vital Signs and I&O
Vital Signs
 
 
Date Time Temp Pulse Resp B/P B/P Pulse O2 O2 Flow FiO2
 
     Mean Ox Delivery Rate 
 
09/14 1200 97.9 56 18 130/70     
 
09/14 1200 97.9 56 18 130/70  99 Room Air  
 
09/14 1000 97.0 66 22 136/81     
 
09/14 0800 97.0 64 26 138/82     
 
09/14 0800 97.0 64 26 138/82  96 Room Air  
 
09/14 0600  83 20 120/76     
 
09/14 0400 96.9 68 22 141/64     
 
09/14 0000 96.2 56 21 124/70     
 
09/14 0000      97 Room Air Room Air 
 
09/13 2300 96.2 56 21 124/70  97 Room Air  
 
09/13 2200  57 16 105/64     
 
09/13 2100 96.9 62 22 140/72     
 
09/13 2100      97 Room Air  
 
09/13 2001 97.5 77 18 120/74  99 Room Air  
 
09/13 1757 97.2 62 20 118/56  98 Room Air  
 
 
 Intake & Output
 
 
 09/14 1600 09/14 0400 09/13 1600 09/13 0400 09/12 1600 09/12 0400
 
Intake Total 179 510    
 
Output Total 1000 850    
 
Balance -821 -340    
 
       
 
Intake, IV 59 510    
 
Intake, Oral 120     
 
Number 0 0    
 
Bowel      
 
Movements      
 
Output, Urine 1000 850    
 
Patient  203 lb 220 lb   
 
Weight      
 
Weight  Bed scale    
 
Measurement      
 
Method      
 
 
 
Physical Exam:
 
General: Well-developed white male in NAD
Skin: No rash or jaundice
HEENT: Conjunctivae pink, sclerae anicteric, mucous membranes moist
Neck: Without masses or thyromegaly, no supraclavicular or cervical adenopathy
Chest: Clear to P&A
Heart: Regular rate and rhythm without S3 or rub
Abdomen: Obese, soft and nontender without palpable masses or organomegaly
Extremities: Without cyanosis or edema
Neuro: Appears to be cognitively intact, no focal findings, no asterixis or 
myoclonus
 
 
 
Assessment/Plan
 
Assessment/Recommendations
Assessment:
 
62-year-old man with no available previous medical records other than a history 
of hypertension and opioid abuse, now comes in with altered mental status of 
unknown duration associated with severe hyponatremia (118).  Serum sodium has 
come up slightly more quickly than one would have hoped -by about 10 mEq/L over 
12 -13 hours but now seems to have stabilized at about 130 after administration 
of IV D5W.  Neurologic status has improved significantly and I suspect that he 
is now at his baseline.  The etiology of the hyponatremia is not entirely 
certain for the moment but bupropion has been associated with hyponatremia/
SIADH.  I am not absolutely convinced that there is not an element of 
psychogenic polydipsia.
 
 
Recommendations:
 
1.  Check TFTs, a.m. cortisol level, serum uric acid (please add to earlier 
blood work)
 
2.  Can decrease infusion of IV D5W with plan to ultimately stop IV fluids late 
this evening but continue him on a p.o. fluid limit of 1,000 cc per day
 
3.  Psych evaluation
 
4.  If possible, try to obtain any previous laboratory records as well as 
information from the brother that he lives with regarding his baseline medical 
and mental status
 
Thank you.  We will follow along with you.

## 2018-09-14 NOTE — PN- CRCU
MIKA Anders MD 09/14/18 0903:
Subjective
HPI/Critical Care Issues:
The patient is awake and remains agitated.  He is not restrained but requires a 
one-to-one sitter.  He is angry and states he wants to go home.  He is 
confabulating.  He has told multiple stories about why he was admitted to the 
hospital.  He cannot recall the details.  He denies taking any prescribed or 
illicit drugs.  The patient's respiratory status is stable noting he is on room 
air.  He is hemodynamically stable as well and afebrile.  The patient now has 
adequate oral intake.  His serum sodium has increased from 118-128 this morning.
 He was changed over to D5W to prevent overcorrection of serum sodium levels.
 
Objective
Current Medications:
 Current Medications
 
 
  Sig/Yoshi Start time  Last
 
Medication Dose Route Stop Time Status Admin
 
Dextrose/Water 1,000 ML Q8H 09/14 0730 AC 
 
  IV   
 
Diphenhydramine HCl 50 MG ONCE ONE 09/13 1730 DC 09/13
 
  IM 09/13 1731  1732
 
Diphenhydramine HCl 0 .STK-MED ONE 09/13 1729 DC 
 
  .ROUTE   
 
Lorazepam 2 MG ONE ONE 09/13 1730 DC 09/13
 
  IV 09/13 1731  1732
 
Lorazepam 2 MG ONE ONE 09/13 1715 CAN 
 
  IV 09/13 1716  
 
Lorazepam 0 .STK-MED ONE 09/13 1713 DC 
 
  .ROUTE   
 
Sodium Chloride 1,000 ML .Q24H 09/13 2045 CAN 
 
  IV   
 
Sodium Chloride 1,000 ML .Q24H 09/13 2030 DC 09/13
 
  IV   2100
 
Sodium Chloride 500 ML ONCE ONE 09/13 1845 DC 09/13
 
  IV 09/14 0059  1945
 
Sodium Chloride 1,000 ML Q10H 09/13 1715 DC 09/13
 
  IV   1732
 
 
 
 
Vital Signs & I&O
Last 24 Hrs of Vitals and I&O:
 Vital Signs
 
 
Date Time Temp Pulse Resp B/P B/P Pulse O2 O2 Flow FiO2
 
     Mean Ox Delivery Rate 
 
09/14 0600  83 20 120/76     
 
09/14 0400 96.9 68 22 141/64     
 
09/14 0000 96.2 56 21 124/70     
 
09/14 0000      97 Room Air Room Air 
 
09/13 2300 96.2 56 21 124/70  97 Room Air  
 
09/13 2200  57 16 105/64     
 
09/13 2100 96.9 62 22 140/72     
 
09/13 2100      97 Room Air  
 
09/13 2001 97.5 77 18 120/74  99 Room Air  
 
09/13 1757 97.2 62 20 118/56  98 Room Air  
 
09/13 1603 95.0 98 20 192/102  99 Room Air  
 
 
 Intake & Output
 
 
 09/14 1600 09/14 0800 09/14 0000
 
Intake Total  179 510
 
Output Total  1000 850
 
Balance  -821 -340
 
    
 
Intake, IV  59 510
 
Intake, Oral  120 
 
Number  0 0
 
Bowel   
 
Movements   
 
Output, Urine  1000 850
 
Patient   203 lb
 
Weight   
 
Weight   Bed scale
 
Measurement   
 
Method   
 
 
Physical Exam
General Appearance agitated, uncooperative
Skin bruising noted on arms, ? track marks on right cubital area
HEENT PERRLA, Mucous Membr. moist/pink
Lymphatic Cervical nl
Cardiovascular Regular Rate, Normal S1, Normal S2
Lungs Normal Air Movement
Abdomen Normal Bowel Sounds, Soft, No Tenderness
Extremities No Edema
 
Results
Last 24 Hrs of Lab Results:
 Laboratory Tests
 
09/14/18 0519:
Anion Gap 10, Estimated GFR > 60, Glucose 93, Calcium 8.8, Phosphorus 3.1, 
Magnesium 2.2, Total Bilirubin 1.3, AST 73  H, ALT 47, Albumin 3.9, CBC w Diff 
NO MAN DIFF REQ, RBC 4.36  L, MCV 89.5, MCH 30.5, MCHC 34.1, RDW 12.9, MPV 7.9, 
Gran % 69.5, Lymphocytes % 16.7  L, Monocytes % 13.2  H, Eosinophils % 0.2, 
Basophils % 0.4, Absolute Granulocytes 8.1  H, Absolute Lymphocytes 1.9, 
Absolute Monocytes 1.5  H, Absolute Eosinophils 0, Absolute Basophils 0.1
 
09/14/18 0017:
Anion Gap 10, Estimated GFR > 60, Glucose 79, Calcium 8.9, Phosphorus 3.5, 
Magnesium 2.2, Total Bilirubin 1.6  H, AST 80  H, ALT 49, Albumin 3.9
 
09/13/18 1932:
 
 
09/13/18 1912:
Urine Opiates Screen 2377.00  H, Methadone Screen > 735  H, Barbiturate Screen <
60, Ur Phencyclidine Scrn < 6.00, Amphetamines Screen < 100, U Benzodiazepines 
Scrn < 85, Urine Cocaine Screen < 50, Urine Cannabis Screen < 5.00, Urine Color 
YEL, Urine Clarity CLEAR, Urine pH 7.0, Ur Specific Gravity 1.010, Urine Protein
NEG, Urine Ketones TRACE  H, Urine Nitrite NEG, Urine Bilirubin NEG, Urine 
Urobilinogen 0.2, Ur Leukocyte Esterase NEG, Ur Microscopic SEDIMENT EXAMINED, 
Urine RBC RARE, Urine WBC RARE, Ur Epithelial Cells RARE, Urine Mucus RARE, 
Urine Hemoglobin TRACE-INTACT  H, Urine Glucose NEG
 
09/13/18 1912:
Methadone Screen Cancelled, Barbiturate Screen Cancelled, Ur Phencyclidine Scrn 
Cancelled, Amphetamines Screen Cancelled, U Benzodiazepines Scrn Cancelled, 
Urine Cocaine Screen Cancelled, Urine Cannabis Screen Cancelled, Urine 
Osmolality 218  L, Ur Random Creatinine 30.7, Ur Random Sodium 30, Ur Random 
Potassium 24.9, Fraction Sodium Excret 0.5
 
09/13/18 1641:
Anion Gap 11, Estimated GFR > 60, BUN/Creatinine Ratio 18.3, Glucose 96, Serum 
Osmolality 259  L, Calcium 9.0, Total Bilirubin 1.6  H, Direct Bilirubin 0.3, 
AST 79  H, ALT 46, Alkaline Phosphatase 73, Troponin I < 0.01, Total Protein 6.9
, Albumin 4.3, Globulin 2.6, Albumin/Globulin Ratio 1.7, Prolactin 8.5, CBC w 
Diff NO MAN DIFF REQ, RBC 4.44  L, MCV 88.0, MCH 30.6, MCHC 34.8, RDW 12.7, MPV 
8.3, Gran % 78.9  H, Lymphocytes % 11.6  L, Monocytes % 9.4  H, Eosinophils % 0,
Basophils % 0.1, Absolute Granulocytes 13.8  H, Absolute Lymphocytes 2.0, 
Absolute Monocytes 1.6  H, Absolute Eosinophils 0, Absolute Basophils 0, 
Salicylates 3.0, Acetaminophen < 10.0  L, Serum Alcohol < 10.0
 
 
Impression/Plan
 
Impression/Plan
Impression/Plan:
1.  Hypotonic hyponatremia, etiology unclear.
2.  Acute delirium, change in mental status.
3.  Leukocytosis, likely reactive.
4.  Chronic pain, on methadone.
5.  History of substance abuse.
6.  Possible opiate/methadone intoxication.
 
Recommendations:
* Continue to monitor serum sodium closely.  Avoid rapid overcorrection.
* Continue D5W.
* Will continue to discuss the case with nephrology.
* Fluid restriction as ordered.
* Restart home dose of methadone this morning.  Will verify the dose with the 
patient's pain clinic.
* Psychiatry consult requested.
* DVT prophylaxis with subcu heparin.
* Continue all supportive care.
 
 
Eugenio Craft. 09/14/18 0923:
Subjective
HPI/Critical Care Issues:
Overnight patient was agitated, attempted to leave; order #7 was called and 
patient was escorted back to bed, 1:1 sitter ordered. Was in restraints in ED, 
off restraints at this time. Patient has poor insight into diagnosis, continues 
to ask why he is here and saying he wants to leave. States that he was in 
[facility] in the beginning of the week for "appendicitis" although did
not have an operation, was "unable to eat or drink anything" for two days and 
was discharged on Tuesday without follow up or medications. States that last 
night he was in a hotel with his girlfriend and apparently she wanted pizza, so 
he went out in his boxers without shoes on to get pizza. Unsure as to why police
found him rummaging through cars, states that "that never happened". Per 
discussion with nursing, patient states that "I was not rummaging through cars, 
I just got into the wrong subaru". Patient states that he feels as though his 
sodium is fine and continues to ask to leave, educated on risks of hyponatremia 
and states he will stay. 
 
His sodium has increased from 118-128, and was placed on D5W to avoid 
overcorrection.
 
Objective
Current Medications:
 Current Medications
 
 
  Sig/Yoshi Start time  Last
 
Medication Dose Route Stop Time Status Admin
 
Dextrose/Water 1,000 ML Q8H 09/14 0730 AC 
 
  IV   
 
Diphenhydramine HCl 50 MG ONCE ONE 09/13 1730 DC 09/13
 
  IM 09/13 1731  1732
 
Diphenhydramine HCl 0 .STK-MED ONE 09/13 1729 DC 
 
  .ROUTE   
 
Lorazepam 2 MG ONE ONE 09/13 1730 DC 09/13
 
  IV 09/13 1731  1732
 
Lorazepam 2 MG ONE ONE 09/13 1715 CAN 
 
  IV 09/13 1716  
 
Lorazepam 0 .STK-MED ONE 09/13 1713 DC 
 
  .ROUTE   
 
Methadone HCl 25 MG DAILY 09/14 0923 UNVr 
 
  PO   
 
Sodium Chloride 1,000 ML .Q24H 09/13 2045 CAN 
 
  IV   
 
Sodium Chloride 1,000 ML .Q24H 09/13 2030 DC 09/13
 
  IV   2100
 
Sodium Chloride 500 ML ONCE ONE 09/13 1845 DC 09/13
 
  IV 09/14 0059  1945
 
Sodium Chloride 1,000 ML Q10H 09/13 1715 DC 09/13
 
  IV   1732
 
 
 
 
Vital Signs & I&O
Last 24 Hrs of Vitals and I&O:
 Vital Signs
 
 
Date Time Temp Pulse Resp B/P B/P Pulse O2 O2 Flow FiO2
 
     Mean Ox Delivery Rate 
 
09/14 1200 97.9 56 18 130/70     
 
09/14 1200 97.9 56 18 130/70  99 Room Air  
 
09/14 1000 97.0 66 22 136/81     
 
09/14 0800 97.0 64 26 138/82     
 
09/14 0800 97.0 64 26 138/82  96 Room Air  
 
09/14 0600  83 20 120/76     
 
09/14 0400 96.9 68 22 141/64     
 
09/14 0000 96.2 56 21 124/70     
 
09/14 0000      97 Room Air Room Air 
 
09/13 2300 96.2 56 21 124/70  97 Room Air  
 
09/13 2200  57 16 105/64     
 
09/13 2100 96.9 62 22 140/72     
 
09/13 2100      97 Room Air  
 
09/13 2001 97.5 77 18 120/74  99 Room Air  
 
09/13 1757 97.2 62 20 118/56  98 Room Air  
 
09/13 1603 95.0 98 20 192/102  99 Room Air  
 
 
 Intake & Output
 
 
 09/14 1600 09/14 0800 09/14 0000
 
Intake Total  179 510
 
Output Total  1000 850
 
Balance  -821 -340
 
    
 
Intake, IV  59 510
 
Intake, Oral  120 
 
Number  0 0
 
Bowel   
 
Movements   
 
Output, Urine  1000 850
 
Patient   203 lb
 
Weight   
 
Weight   Bed scale
 
Measurement   
 
Method   
 
 
 
 
Exam
General Appearance: well developed/nourished, no apparent distress, alert, awake
, anxious
Head: atraumatic
Respiratory: normal breath sounds, lungs clear
Cardiovascular: regular rate/rhythm
Abdomen: soft, non-tender
Extremities: normal inspection, no edema
 
Results
Last 24 Hrs of Lab Results:
 Laboratory Tests
 
09/14/18 1200:
Sodium Cancelled
 
09/14/18 1005:
 
 
09/14/18 0519:
Anion Gap 10, Estimated GFR > 60, Glucose 93, Calcium 8.8, Phosphorus 3.1, 
Magnesium 2.2, Total Bilirubin 1.3, AST 73  H, ALT 47, Albumin 3.9, CBC w Diff 
NO MAN DIFF REQ, RBC 4.36  L, MCV 89.5, MCH 30.5, MCHC 34.1, RDW 12.9, MPV 7.9, 
Gran % 69.5, Lymphocytes % 16.7  L, Monocytes % 13.2  H, Eosinophils % 0.2, 
Basophils % 0.4, Absolute Granulocytes 8.1  H, Absolute Lymphocytes 1.9, 
Absolute Monocytes 1.5  H, Absolute Eosinophils 0, Absolute Basophils 0.1
 
09/14/18 0017:
Anion Gap 10, Estimated GFR > 60, Glucose 79, Calcium 8.9, Phosphorus 3.5, 
Magnesium 2.2, Total Bilirubin 1.6  H, AST 80  H, ALT 49, Albumin 3.9
 
09/13/18 1932:
 
 
09/13/18 1912:
Urine Opiates Screen 2377.00  H, Methadone Screen > 735  H, Barbiturate Screen <
60, Ur Phencyclidine Scrn < 6.00, Amphetamines Screen < 100, U Benzodiazepines 
Scrn < 85, Urine Cocaine Screen < 50, Urine Cannabis Screen < 5.00, Urine Color 
YEL, Urine Clarity CLEAR, Urine pH 7.0, Ur Specific Gravity 1.010, Urine Protein
NEG, Urine Ketones TRACE  H, Urine Nitrite NEG, Urine Bilirubin NEG, Urine 
Urobilinogen 0.2, Ur Leukocyte Esterase NEG, Ur Microscopic SEDIMENT EXAMINED, 
Urine RBC RARE, Urine WBC RARE, Ur Epithelial Cells RARE, Urine Mucus RARE, 
Urine Hemoglobin TRACE-INTACT  H, Urine Glucose NEG
 
09/13/18 1912:
Methadone Screen Cancelled, Barbiturate Screen Cancelled, Ur Phencyclidine Scrn 
Cancelled, Amphetamines Screen Cancelled, U Benzodiazepines Scrn Cancelled, 
Urine Cocaine Screen Cancelled, Urine Cannabis Screen Cancelled, Urine 
Osmolality 218  L, Ur Random Creatinine 30.7, Ur Random Sodium 30, Ur Random 
Potassium 24.9, Fraction Sodium Excret 0.5
 
09/13/18 1641:
Anion Gap 11, Estimated GFR > 60, BUN/Creatinine Ratio 18.3, Glucose 96, Serum 
Osmolality 259  L, Calcium 9.0, Total Bilirubin 1.6  H, Direct Bilirubin 0.3, 
AST 79  H, ALT 46, Alkaline Phosphatase 73, Troponin I < 0.01, Total Protein 6.9
, Albumin 4.3, Globulin 2.6, Albumin/Globulin Ratio 1.7, Prolactin 8.5, CBC w 
Diff NO MAN DIFF REQ, RBC 4.44  L, MCV 88.0, MCH 30.6, MCHC 34.8, RDW 12.7, MPV 
8.3, Gran % 78.9  H, Lymphocytes % 11.6  L, Monocytes % 9.4  H, Eosinophils % 0,
Basophils % 0.1, Absolute Granulocytes 13.8  H, Absolute Lymphocytes 2.0, 
Absolute Monocytes 1.6  H, Absolute Eosinophils 0, Absolute Basophils 0, 
Salicylates 3.0, Acetaminophen < 10.0  L, Serum Alcohol < 10.0
 
 
Impression/Plan
 
Impression/Plan
Impression/Plan:
Mr Sood is a 62M with a PMH of polysubstance abuse on Methadone 25mg daily, 
HTN brought in by police in altered state, found to have sodium of 118 on 
admission and confusion and aggression, given these findings thought to be 
symptomatic hyponatremia and so brought to ICU for hypertonic saline 
administration, now with sodium of 128 on D5W to prevent overcorrection.
 
Respiratory: stable
-Stable on room air
 
Infectious Disease:
-Did have leukocytosis on admission trending down, afebrile, likely reactive
-Will monitor off abx; cultures were drawn on admission 
-If febrile or symptomatic will start abx
 
Cardiovascular/Circulation: stable
-NSR+SB on monitor, blood pressures stable
 
Hematological: stable
-H/H 13.3/39.0
 
Metabolic: hypotonic hyponatremia
-Sodium at 131 at 10:00am, D5W rate increased
-Pending 2pm sodium, sodium q4
-Nephro recs appreciated
-Mild transaminitis will monitor with ICU bundle, scheduled for RUQ ultrasound 
but cancelled due to patient eating; will plan on tomorrow AM potential NPO 
overnight
-Tox positive for opiates, methadone, serum alcohol negative on admission
-Restarted on his methadone dose 25mg, called [facility] in 
[place] and spoke with patients nurse who administers his dose; last dose was
on the 12th. Patient had claimed he was taking 45mg of methadone.
-Per [place]MP, patient has large amount of benzos prescribed (Lorazepam 2mg 120 
pills for 30 days), will monitor for withdrawal
 
 
Alimentary:
-Regular diet, fluid restricted
 
 
Neurologic:
-Intact, on 1:1 sitter
-Occasionally agitated, but has not been on restraints today
-Psych consult placed given bizarre patient behavior and patient following 
outpatient psychiatrist, appreciate recs
 
Nephro:
-Stable
 
We are currently holding patient meds, will call patients pharmacy to confirm 
meds and restart tomorrow.
 
 
DVT PPX
IV access
Full Code

## 2018-09-14 NOTE — CONS- PSYCHIATRY
Psychiatric Consult
Date of Consult: 09/14/18
Reason for Consult:
capacity
Allergies:
Coded Allergies:
No Known Allergies (09/13/18)
 
 
Past History
 
Past Medical History
Neurological: NONE
EENT: NONE
Cardiovascular: hypertension
Respiratory: NONE
Gastrointestinal: NONE
Hepatic: NONE
Renal: NONE
Musculoskeletal: NONE
Psychiatric: substance abuse
Endocrine: NONE
Blood Disorders: NONE
Cancer(s): NONE
 
Past Surgical History
Surgical History: unobtainable
 
Assessment/Plan
Impression:
From chart.
 
62-year-old gentleman past medical history significant for hypertension, 
substance abuse disorder on methadone brought in altered by police.  In ED was 
found to be hyponatremic to 118 with leukocytosis of 17.5 with no bandemia.  UA 
clear.  U tox significant for opioids and methadone. 
CT head shows no acute intracranial hemorrhage or abnormal extra-axial 
collection. No intracranial mass effect or midline shift.  Chest x-ray 
unremarkable. Pt was found in parking lot rummaging through cars in no shoes. 
Altered mental status in ED.
 
On exam pt is lying flat looking uncomfortable. He is perseverative with answers
; I'll be ok Ill be OK. Speech is mildly encephalopathic, sometimes clear 
answers to short questions, otherwise is not making clear sense. He is not able 
to process information properly or make informed decisions. His attention is 
impaired on months backwards, oriented to person place year month not day. Knows
Desean Boo. Any other more compliacted questions cause him to stare off or 
make non-sensical answers. Insight absent judgment poor.
 
IMP
Delirium 
Unclear contribution of ?BZD withdrawal, vitals have been somewhat labile but 
mostly stable.
 
1. Delirium is driven by a direct physiologic consequence of a general medical 
condition, and with treatment of baseline medical issues delirious symptoms are 
likely to improve. Unfortunately, if not resolved within a short period of time 
delirium is likely to prolong recovery, prolong hospital stay, increase 
mortality rate. Continued aggressive identification and treatment of the 
conditions that triggered the delirious state is the definitive treatment. In 
his case, the hyponatremia is the most likely cause but pt on methadone with 
high doses of benzo and may have abruptly stopped benzo. His history is unclear 
due to altered mental status. Need collateral. Wellbutrin very rarely causes 
hyponatremia. 
 
2. A low-dose antipsychotic can be used for agitation. Please check EKG and 
monitor QTc while patient is receiving antipsychotic medications. Replete K and 
Mg as needed. Monitor for development of dystonia or extrapyramidal symptoms and
ise 1 mg cogentin if he develops EPS. 
 
3. CIWA for possible GABAergic withdrawal; per ctpmp on 2 mg ativan QID. Keep in
mind his CIWA is scored on mostly subjective distress which he may not be able 
to express given his mental status. Watch vitals carefully and be aware even 
vitals may not be indicative of withdrawal if he is on antihypertensive. 
 
4. Avoid anticholinergic drugs to the extent possible. Seroquel is 
anticholinergic in part, would avoid.
 
5. Non pharmacologic evidence based treatment includes repeated reorientation, 
promotion of good sleep hygiene, room with window, early mobilization, 
correction of dehydration, use of sensory aids (glasses, hearing aids), familiar
faces (family, primary nurse) and the minimization of unnecessary noise and 
stimuli. He has done well with sitter would keep to avoid complications of 
management of agitation.
 
6. Due to patient having impaired capacity to make informed decision regarding 
certain aspects of medical care would recommend identifying surrogate decision 
maker. He cannot leave hospital AMA.
 
7. If there are any concerns for patient's safety, keep sitter until mental 
status clears. Be sure to reassess before d/c due to waxing and waning nature of
delirium.
 
8. Continue to address medical issues, including monitoring of appropriate lab 
values vital signs, as you are.
 
9. Get records from [facility] / [place].
 
Call on-call psych with any further issues.
 
JScruggsMD [facility] [place]  Delivery (>/= 37 weeks)

## 2018-09-15 VITALS — SYSTOLIC BLOOD PRESSURE: 128 MMHG | DIASTOLIC BLOOD PRESSURE: 76 MMHG

## 2018-09-15 VITALS — DIASTOLIC BLOOD PRESSURE: 82 MMHG | SYSTOLIC BLOOD PRESSURE: 148 MMHG

## 2018-09-15 VITALS — DIASTOLIC BLOOD PRESSURE: 77 MMHG | SYSTOLIC BLOOD PRESSURE: 129 MMHG

## 2018-09-15 VITALS — SYSTOLIC BLOOD PRESSURE: 132 MMHG | DIASTOLIC BLOOD PRESSURE: 86 MMHG

## 2018-09-15 VITALS — DIASTOLIC BLOOD PRESSURE: 80 MMHG | SYSTOLIC BLOOD PRESSURE: 134 MMHG

## 2018-09-15 VITALS — DIASTOLIC BLOOD PRESSURE: 76 MMHG | SYSTOLIC BLOOD PRESSURE: 128 MMHG

## 2018-09-15 VITALS — DIASTOLIC BLOOD PRESSURE: 84 MMHG | SYSTOLIC BLOOD PRESSURE: 142 MMHG

## 2018-09-15 LAB
ABSOLUTE GRANULOCYTE CT: 8.6 /CUMM (ref 1.4–6.5)
BASOPHILS # BLD: 0.1 /CUMM (ref 0–0.2)
BASOPHILS NFR BLD: 0.5 % (ref 0–2)
EOSINOPHIL # BLD: 0.1 /CUMM (ref 0–0.7)
EOSINOPHIL NFR BLD: 0.5 % (ref 0–5)
ERYTHROCYTE [DISTWIDTH] IN BLOOD BY AUTOMATED COUNT: 13.5 % (ref 11.5–14.5)
GRANULOCYTES NFR BLD: 64.5 % (ref 42.2–75.2)
HCT VFR BLD CALC: 40.3 % (ref 42–52)
LYMPHOCYTES # BLD: 3 /CUMM (ref 1.2–3.4)
MCH RBC QN AUTO: 30.6 PG (ref 27–31)
MCHC RBC AUTO-ENTMCNC: 33.7 G/DL (ref 33–37)
MCV RBC AUTO: 90.9 FL (ref 80–94)
MONOCYTES # BLD: 1.6 /CUMM (ref 0.1–0.6)
PLATELET # BLD: 291 /CUMM (ref 130–400)
PMV BLD AUTO: 8.5 FL (ref 7.4–10.4)
RED BLOOD CELL CT: 4.43 /CUMM (ref 4.7–6.1)
WBC # BLD AUTO: 13.3 /CUMM (ref 4.8–10.8)

## 2018-09-15 NOTE — PN- RESIDENT CRCU
Eugenio Craft 09/15/18 0801:
Subjective
HPI/CRCU Issues:
Hyponatremia s/p hypertonic saline admin
Opioid dependence on methadone
24 Hour Events:
No acute events overnight.  Today patient is much more calm, no agitation.  
Patient states that he feels as though he was not eating enough over this week 
which is why his sodium was low.  Patient states that he feels much better 
today.  Patient is asking if he can walk around today.  Denies fevers/chills/
night sweats/chest pain/abdominal pain/urinary symptoms/lower extremity edema
 
Objective
 
Vital Signs & I&O
Last 8 Hrs of Vitals and I&O:
Asymptomatic bradycardia, vitally stable otherwise
 
Exam
General Appearance: well developed/nourished, no apparent distress, alert, awake
, comfortable
Head: atraumatic
Respiratory: normal breath sounds, lungs clear
Cardiovascular: bradycardia
Gastrointestinal: soft, non-tender
Extremities: normal inspection, no edema
Cranial Nerves: normal hearing, normal speech
Skin: intact
Skin Temp/Moisture Exam: Warm/Dry
Current Medications:
 Current Medications
 
 
  Sig/Yoshi Start time  Last
 
Medication Dose Route Stop Time Status Admin
 
Acetaminophen 650 MG Q6P PRN  1615 AC 
 
  PO   
 
Bupropion HCl 300 MG DAILY 09/15 0900 AC 09/15
 
  PO   0815
 
Dextrose/Water 1,000 ML Q8H  0730 DC 
 
  IV   1514
 
Haloperidol 0.5 MG FOUR TIMES A DAY PRN 09/15 1030 AC 
 
  PO   
 
Methadone HCl 25 MG DAILY  0923 AC 09/15
 
  PO   0816
 
Metoprolol Succinate 100 MG DAILY 09/15 0900 AC 09/15
 
  PO   0815
 
Tamsulosin HCl 0.4 MG DAILY 09/15 0900 AC 09/15
 
  PO   0815
 
 
 
 
Impression/Plan
 
Impression/Problem List
Impression:
Mr Sood is a 62M with a PMH of polysubstance abuse on Methadone 25mg daily, 
HTN brought in by police in altered state, found to have sodium of 118 on 
admission and confusion and aggression, given these findings thought to be 
symptomatic hyponatremia and so brought to ICU for hypertonic saline 
administration, now with sodium of 131 s/p D5W to prevent overcorrection.  He 
stable and can be transferred to telemetry.
 
Respiratory: stable
-Stable on room air
 
Infectious Disease:
-Did have leukocytosis on admission trending down, afebrile, likely reactive
-Will monitor off abx; cultures were drawn on admission 
-If febrile or symptomatic will start abx
 
Cardiovascular/Circulation: stable
-NSR+SB on monitor, blood pressures stable
 
Hematological: stable
-H/H 13.6/40.3
 
Metabolic: hypotonic hyponatremia
-Sodium at 131, off d5w
-Nephro recs appreciated
-Tox positive for opiates, methadone, serum alcohol negative on admission
-Restarted on his methadone dose 25mg, called [facility] in 
[place] and spoke with patients nurse who administers his dose; last dose was
on the . Patient had claimed he was taking 45mg of methadone.
-Per [place]PMP, patient has large amount of benzos prescribed (Lorazepam 2mg 120 
pills for 30 days), will monitor for withdrawal
 
 
Alimentary:
-Regular diet, fluid restricted to 1000 cc
 
 
Neurologic:
-Intact, on 1:1 sitter
-Occasionally agitated, but has not been on restraints today
-Psych consult placed given bizarre patient behavior and patient following 
outpatient psychiatrist, appreciate recs

low-dose antipsychotics if patient is agitated, QTC is within normal limits.
 
Nephro:
-Stable
 
Restarted patient's home meds.
 
DVT PPX
IV access
Full Code
Problem List:
 1. Hyponatremia
 
Pain Ratin
Tomorrow's Labs & Rationales:
CBCs, BEP
 
Plan
DVT/Prophylaxis: Dc Irwin MD 09/15/18 1019:
Attending MD Review Statement
 
Attending Sign Off
Attending Cosign Statement:
I have: examined this patient, reviewed al EMR data, personally reviewd 
images, discussd w/resident/PA/NP, discussed mgmt plan w/gabi, discussed mgmt 
plan w/CM, discussed mgmt plan w/pt, agreed w/resident/PA/NP, amended to note. 
Other Findings:
Impression
62 year old man
 
* hyponatremia - unclear etiology, concern for medication induced (bupropion) 
vs. SIADH
* depression
* htn
* history of opiate dependence - methadone program
 
Plan
Respiratory
-no active issues
-cxr unremarkable
 
ID
-mild leukocytosis
-tmax 98.2
-cultures are negative
-currently not on antibiotics
-will monitor
 
[facility]
-monitor hemodynamics
 
Heme
-monitor cbc's
 
Metabolic
-monitor ins/outs
-nephrology appreciated
-1000cc fluid restriction
-s/p hypertonic saline
 
Alimentary
-diet
-1000cc restriction
 
Neuro
-psychiatry appreciated
-patient is not to leave AMA per psychiatry
-continue methadone
 
DVT prophylaxis at all times
 
Attending time spent 35 min

## 2018-09-15 NOTE — TRANSFER OF CARE SUMMARY
Hospital Course
 
Course
Hospital Course:
62-year-old gentleman past medical history significant for hypertension, opioid 
abuse brought in by ambulance after police was called when he was found 
wandering around a grocery store without any shoes on.  
 
In ED was found to be hyponatremic to 118 with leukocytosis of 17.5 with no 
bandemia.  UA clear.  U tox significant for opioids and methadone. 
CT head shows no acute intracranial hemorrhage or abnormal extra-axial 
collection. No intracranial mass effect or midline shift.  Chest x-ray 
unremarkable
 
He was admitted to ICU for hypertonic saline administration given altered mental
status and low sodium. Overnight patient was agitated, attempted to leave; order
#7 was called and patient was escorted back to bed, 1:1 sitter ordered. Was in 
restraints in ED, off restraints HD#2. Patient has poor insight into diagnosis, 
continued to ask why he is here and saying he wanted to leave. States that he 
was in [facility] in the beginning of the week for "appendicitis" 
although did not have an operation, was "unable to eat or drink anything" for 
two days and was discharged on Tuesday without follow up or medications. States 
that prior to admission he was in a hotel with his girlfriend and apparently she
wanted pizza, so he went out in his boxers without shoes on to get pizza. Unsure
as to why police found him rummaging through cars, states that "that never 
happened". However, Per discussion with nursing, patient states that "I was not 
rummaging through cars, I just got into the wrong subaru". His sodium increased 
from 118-128, and was placed on D5W to avoid overcorrection. He was started on a
diet with fluid restriction 1000cc/hr. Psychiatry assessed patient and 
determined he had no capacity to leave AMA. Overnight his sodium normalized, was
taken off D5 and was stable from ICU perspective. He did have asymptomatic 
bradycardia, and so will be transferred to Telemetry for further monitoring.
 
Pertinent Lab Results:
Sodium on admission 118
9/15/18 0330 sodium - 131
Assessment/Plan:
Mr Sood is a 62M with a PMH of polysubstance abuse on Methadone 25mg daily, 
HTN brought in by police in altered state, found to have sodium of 118 on 
admission and confusion and aggression, given these findings thought to be 
symptomatic hyponatremia and so brought to ICU for hypertonic saline 
administration, now with sodium of 131 s/p D5W to prevent overcorrection.  He 
stable and can be transferred to telemetry.
 
Respiratory: stable
-Stable on room air
 
Infectious Disease:
-Did have leukocytosis on admission trending down, afebrile, likely reactive
-Will monitor off abx; cultures were drawn on admission 
-If febrile or symptomatic will start abx
 
Cardiovascular/Circulation: stable
-NSR+SB on monitor, blood pressures stable
 
Hematological: stable
-H/H 13.6/40.3
 
Metabolic: hypotonic hyponatremia
-Sodium at 131, off d5w
-Nephro recs appreciated
-Tox positive for opiates, methadone, serum alcohol negative on admission
-Restarted on his methadone dose 25mg, called [facility] in 
[place] and spoke with patients nurse who administers his dose; last dose was
on the 12th. Patient had claimed he was taking 45mg of methadone.
-Per CTPMP, patient has large amount of benzos prescribed (Lorazepam 2mg 120 
pills for 30 days), will monitor for withdrawal
 
 
Alimentary:
-Regular diet, fluid restricted to 1000 cc
 
 
Neurologic:
-Intact, on 1:1 sitter
-Occasionally agitated, but has not been on restraints today
-Psych consult placed given bizarre patient behavior and patient following 
outpatient psychiatrist, appreciate recs

low-dose antipsychotics if patient is agitated, QTC is within normal limits.
 
Nephro:
-Stable
 
Restarted patient's home meds.
 
DVT PPX
IV access
Full Code

## 2018-09-15 NOTE — PN- NEPHROLOGY
Assessment/Plan Nephrology
Assessment:
Impression :Hyponatremia. Etiology unclear.  Although it is reasonably likely he
may have SIADH his low admission U Osm should not have resulted in hyponatremia.
(U Osm 218).  With normal Osmotic diet (800 mOsm) he should have been able to 
handle 3.5 L (800/218) . It is possible his osmotic intake were significantly 
lower   I suspect a significant part of his hyponatremia is due to inadequate 
solute intake.  Would recheck U Osm U Na and always reassess when the clinical 
picture does not fit the presumed diagnosis (If U Osm is high (400+) in the 
setting of adequate volume explansion 
I discussed this in detail with the ICU team.
Thanks
Gurvinder Sanchez MD
Suggestion:
.
 
 
Subjective
Subjective:
PT comfortable sodium stable
 
Objective
Vital Signs and I&Os
 
M NAD
142/84
Lungs clear
Cor RRR\
Abd soft
Ext neg edema
 
 
Results
Pertinent Lab Results:
 
131 / 97 / 11 /
3.6  / 25 / 0.7

## 2018-09-16 VITALS — SYSTOLIC BLOOD PRESSURE: 124 MMHG | DIASTOLIC BLOOD PRESSURE: 74 MMHG

## 2018-09-16 VITALS — SYSTOLIC BLOOD PRESSURE: 128 MMHG | DIASTOLIC BLOOD PRESSURE: 90 MMHG

## 2018-09-16 VITALS — DIASTOLIC BLOOD PRESSURE: 74 MMHG | SYSTOLIC BLOOD PRESSURE: 122 MMHG

## 2018-09-16 VITALS — DIASTOLIC BLOOD PRESSURE: 62 MMHG | SYSTOLIC BLOOD PRESSURE: 118 MMHG

## 2018-09-16 VITALS — DIASTOLIC BLOOD PRESSURE: 96 MMHG | SYSTOLIC BLOOD PRESSURE: 152 MMHG

## 2018-09-16 LAB
ABSOLUTE GRANULOCYTE CT: 9.4 /CUMM (ref 1.4–6.5)
BASOPHILS # BLD: 0 /CUMM (ref 0–0.2)
BASOPHILS NFR BLD: 0.4 % (ref 0–2)
EOSINOPHIL # BLD: 0.1 /CUMM (ref 0–0.7)
EOSINOPHIL NFR BLD: 0.6 % (ref 0–5)
ERYTHROCYTE [DISTWIDTH] IN BLOOD BY AUTOMATED COUNT: 13.5 % (ref 11.5–14.5)
GRANULOCYTES NFR BLD: 74.5 % (ref 42.2–75.2)
HCT VFR BLD CALC: 38.1 % (ref 42–52)
LYMPHOCYTES # BLD: 1.9 /CUMM (ref 1.2–3.4)
MCH RBC QN AUTO: 30.6 PG (ref 27–31)
MCHC RBC AUTO-ENTMCNC: 34.1 G/DL (ref 33–37)
MCV RBC AUTO: 89.6 FL (ref 80–94)
MONOCYTES # BLD: 1.2 /CUMM (ref 0.1–0.6)
PLATELET # BLD: 290 /CUMM (ref 130–400)
PMV BLD AUTO: 8.3 FL (ref 7.4–10.4)
RED BLOOD CELL CT: 4.25 /CUMM (ref 4.7–6.1)
WBC # BLD AUTO: 12.7 /CUMM (ref 4.8–10.8)

## 2018-09-16 NOTE — PN- HOUSESTAFF
Andrew Johnson 18 0848:
Subjective
Follow-up For:
Hyponatremia s/p hypertonic saline admin
Opioid dependence on methadone
Subjective:
Afebrile overnight. Patient is seen and examined this morning. Patient is 
sitting up in the bedside chair and states he had a slight feeling of nausea 
this morning but otherwise is feeling fine today. Patient denies any chest pain,
shortness of breath, fevers, chills, and fatigue.
 
Review of Systems
Constitutional:
Reports: see HPI. 
 
Objective
Last 24 Hrs of Vital Signs/I&O
 Vital Signs
 
 
Date Time Temp Pulse Resp B/P B/P Pulse O2 O2 Flow FiO2
 
     Mean Ox Delivery Rate 
 
 0811  61  146/88     
 
 0810  61  146/88     
 
 0652 97.5 53 98 152/96  18 Room Air  
 
 0200 98.1 50 16 122/74     
 
 0000 98.4 50 16 124/74     
 
09/15 2345 98.1 56 18 132/86  98 Room Air  
 
09/15 2200 98.4 60 16 128/76     
 
09/15 2000 98.4 60 16 128/76     
 
09/15 1348 98.4 60 16 128/76  98 Room Air  
 
09/15 1200 98.1 70 20 142/84     
 
 
 Intake & Output
 
 
  1600  0800  0000
 
Intake Total  240 240
 
Output Total   
 
Balance  240 240
 
    
 
Intake, Oral  240 240
 
Patient   197 lb
 
Weight   
 
 
 
 
Physical Exam
General Appearance: Alert, Oriented X3, Cooperative, No Acute Distress
Skin: No Rashes, No Breakdown
HEENT: Atraumatic
Neck: Supple, No JVD
Cardiovascular: Regular Rate, Normal S1, Normal S2
Lungs: Clear to Auscultation
Abdomen: Soft, No Tenderness
Extremities: No Edema, Normal Pulses
 
Assessment/Plan
Assessment:
62M with a PMH of polysubstance abuse on Methadone 25mg daily, HTN brought in by
police in altered state, found to have sodium of 118 on admission and confusion 
and aggression, given these findings thought to be symptomatic hyponatremia and 
so brought to ICU for hypertonic saline administration, now with sodium of 131 s
/p D5W to prevent overcorrection.  He stable and can be transferred to 
telemetry.
 
Infectious Disease:
-Did have leukocytosis on admission trending down, afebrile, likely reactive
-Will monitor off abx; cultures were drawn on admission 
-If febrile or symptomatic will start abx
 
Metabolic: hypotonic hyponatremia
-Sodium at 132, off d5w
-Nephro recs appreciated
-Tox positive for opiates, methadone, serum alcohol negative on admission
-Restarted on his methadone dose 25mg, called [facility] in 
[place] and spoke with patients nurse who administers his dose; last dose was
on the 12th. Patient had claimed he was taking 45mg of methadone.
-Per CTPMP, patient has large amount of benzos prescribed (Lorazepam 2mg 120 
pills for 30 days), will monitor for withdrawal
 
Alimentary:
-Regular diet, fluid restricted to 1000 cc
 
Neurologic:
-Intact, on 1:1 sitter
-Occasionally agitated, but has not been on restraints today
-Psych consult placed given bizarre patient behavior and patient following 
outpatient psychiatrist, appreciate recs

low-dose antipsychotics if patient is agitated, QTC is within normal limits.
 
DVT PPX
IV access
Full Code
Problem List:
 1. Hyponatremia
 
 2. Altered mental status
 
Pain Ratin
Pain Location:
na
Pain Goal: Remain pain free
Pain Plan:
na
Tomorrow's Labs & Rationales:
routine
 
 
Dc Monahan MD 18 0944:
Attending MD Review Statement
 
Attending Statement
Attending MD Statement: examined this patient, discuss w/resident/PA/NP, agreed 
w/resident/PA/NP, discussed with family, reviewed EMR data (avail), discussed 
with nursing, discussed with case mgmt, reviewed images, amended to note
Attending Assessment/Plan:
Dc JONES M.D. have examined this patient, reviewed available EMR data, 
personally reviewed images, discussed with resident/PA/NP, discussed management 
plan with housestaff and nursing staff, discussed managment plan all of 
healthcare providers, discussed management plan with patient and/or family, 
agreed with resident/PA/NP.
The past history and parts of the chart have been autopopulated.
 
Impression
62 year old man
 
* hyponatremia - unclear etiology, concern for medication induced (bupropion) 
vs. SIADH
* depression
* htn
* history of opiate dependence - methadone program
 
Plan
-cxr unremarkable
-mild leukocytosis improved
-tmax 98.4
-cultures are negative
-currently not on antibiotics
-will monitor
-monitor hemodynamics
-nephrology appreciated
-1000cc fluid restriction
-s/p hypertonic saline
-psychiatry appreciated
-patient is not to leave AMA per psychiatry
-continue methadone
 
DVT prophylaxis at all times

## 2018-09-17 VITALS — DIASTOLIC BLOOD PRESSURE: 70 MMHG | SYSTOLIC BLOOD PRESSURE: 124 MMHG

## 2018-09-17 VITALS — DIASTOLIC BLOOD PRESSURE: 80 MMHG | SYSTOLIC BLOOD PRESSURE: 130 MMHG

## 2018-09-17 VITALS — DIASTOLIC BLOOD PRESSURE: 70 MMHG | SYSTOLIC BLOOD PRESSURE: 130 MMHG

## 2018-09-17 LAB
ABSOLUTE GRANULOCYTE CT: 9.8 /CUMM (ref 1.4–6.5)
BASOPHILS # BLD: 0 /CUMM (ref 0–0.2)
BASOPHILS NFR BLD: 0.3 % (ref 0–2)
EOSINOPHIL # BLD: 0 /CUMM (ref 0–0.7)
EOSINOPHIL NFR BLD: 0.3 % (ref 0–5)
ERYTHROCYTE [DISTWIDTH] IN BLOOD BY AUTOMATED COUNT: 13.4 % (ref 11.5–14.5)
GRANULOCYTES NFR BLD: 74.7 % (ref 42.2–75.2)
HCT VFR BLD CALC: 36.7 % (ref 42–52)
LYMPHOCYTES # BLD: 1.9 /CUMM (ref 1.2–3.4)
MCH RBC QN AUTO: 30.7 PG (ref 27–31)
MCHC RBC AUTO-ENTMCNC: 33.9 G/DL (ref 33–37)
MCV RBC AUTO: 90.5 FL (ref 80–94)
MONOCYTES # BLD: 1.3 /CUMM (ref 0.1–0.6)
PLATELET # BLD: 270 /CUMM (ref 130–400)
PMV BLD AUTO: 8.4 FL (ref 7.4–10.4)
RED BLOOD CELL CT: 4.06 /CUMM (ref 4.7–6.1)
WBC # BLD AUTO: 13.1 /CUMM (ref 4.8–10.8)

## 2018-09-17 NOTE — PN- HOUSESTAFF
Andrew Johnson 18 0716:
Subjective
Follow-up For:
Hyponatremia s/p hypertonic saline admin
Opioid dependence on methadone
Subjective:
Afebrile overnight. Patient is seen and examined today. Patient complains of the
persistent abdominal pains from yesterday. Patient states his abdominal pain is 
located lower down and points to his suprapubic area. Patient denies any dysuria
, urgency, or frequency. Patient does reports some nausea, usually before meals.
Patient has been responding well to Pepcid for his abdominal complaints. Patient
otherwise states he would like to receive more water and refuses the fluid 
restriction. 
 
Review of Systems
Constitutional:
Reports: see HPI. 
 
Objective
Last 24 Hrs of Vital Signs/I&O
 Vital Signs
 
 
Date Time Temp Pulse Resp B/P B/P Pulse O2 O2 Flow FiO2
 
     Mean Ox Delivery Rate 
 
 0845  65  128/84     
 
 0845  65  128/84     
 
 0643 98.6 52 19 124/70  96   
 
 2200 98.8 63 18 128/90  99   
 
 1500 98.5 55 18 118/62  96 Room Air  
 
 
 Intake & Output
 
 
  1600  0800  0000
 
Intake Total   230
 
Output Total   
 
Balance   230
 
    
 
Intake, Oral   230
 
Patient   198 lb
 
Weight   
 
 
 
 
Physical Exam
General Appearance: Alert, Oriented X3, Cooperative, No Acute Distress
Skin: No Rashes
HEENT: Atraumatic
Neck: Supple, No JVD
Cardiovascular: Regular Rate, Normal S1, Normal S2
Lungs: Clear to Auscultation
Abdomen: Soft, mild tenderness elicited in the lower quadrants
Neurological: Normal Speech
Extremities: No Edema, Normal Pulses
 
Assessment/Plan
Assessment:
62M with a PMH of polysubstance abuse on Methadone 25mg daily, HTN brought in by
police in altered state, found to have sodium of 118 on admission and confusion 
and aggression, given these findings thought to be symptomatic hyponatremia and 
so brought to ICU for hypertonic saline administration, now with sodium of 131 s
/p D5W to prevent overcorrection.  He stable and can be transferred to 
telemetry.
 
Infectious Disease:
-Did have leukocytosis on admission trending down, afebrile, likely reactive
-Will monitor off abx; cultures were drawn on admission; no growths as of yet
-If febrile or symptomatic will start abx
 
Metabolic: hypotonic hyponatremia
-Sodium at 132, off d5w
-Nephro recs appreciated
-Tox positive for opiates, methadone, serum alcohol negative on admission
-Restarted on his methadone dose 25mg, called [facility] in 
[place] and spoke with patients nurse who administers his dose; last dose was
on the . Patient had claimed he was taking 45mg of methadone.
-Per CTPMP, patient has large amount of benzos prescribed (Lorazepam 2mg 120 
pills for 30 days), will monitor for withdrawal
 
Alimentary:
-Regular diet, fluid restricted to 1000 cc; dc'd fluid restriction 
 
Neurologic:
-Intact, on 1:1 sitter; dc'd sitter
-Occasionally agitated, but has not been on restraints today
-Psych consult placed given bizarre patient behavior and patient following 
outpatient psychiatrist, appreciate recs

low-dose antipsychotics if patient is agitated, QTC is within normal limits.
 
DVT PPX
IV access
Full Code
Problem List:
 1. Hyponatremia
 
 2. Altered mental status
 
Pain Ratin
Pain Location:
abdomen, lower
Pain Goal: Remain pain free
Pain Plan:
prn meds
Tomorrow's Labs & Rationales:
routine
 
 
Rufino Dodd MD 18 1338:
Attending MD Review Statement
 
Attending Statement
Attending MD Statement: examined this patient, discuss w/resident/PA/NP, agreed 
w/resident/PA/NP, reviewed EMR data (avail)
Attending Assessment/Plan:
Continue current management, monitor sodium, nephrology and psychiatry consults,
continue home meds, DVT PPx

## 2018-09-18 VITALS — DIASTOLIC BLOOD PRESSURE: 80 MMHG | SYSTOLIC BLOOD PRESSURE: 122 MMHG

## 2018-09-18 VITALS — SYSTOLIC BLOOD PRESSURE: 138 MMHG | DIASTOLIC BLOOD PRESSURE: 84 MMHG

## 2018-09-18 LAB
ABSOLUTE GRANULOCYTE CT: 9.9 /CUMM (ref 1.4–6.5)
BASOPHILS # BLD: 0.1 /CUMM (ref 0–0.2)
BASOPHILS NFR BLD: 0.4 % (ref 0–2)
EOSINOPHIL # BLD: 0.1 /CUMM (ref 0–0.7)
EOSINOPHIL NFR BLD: 0.7 % (ref 0–5)
ERYTHROCYTE [DISTWIDTH] IN BLOOD BY AUTOMATED COUNT: 13.5 % (ref 11.5–14.5)
GRANULOCYTES NFR BLD: 72.8 % (ref 42.2–75.2)
HCT VFR BLD CALC: 39.1 % (ref 42–52)
LYMPHOCYTES # BLD: 2.3 /CUMM (ref 1.2–3.4)
MCH RBC QN AUTO: 30.5 PG (ref 27–31)
MCHC RBC AUTO-ENTMCNC: 33.8 G/DL (ref 33–37)
MCV RBC AUTO: 90 FL (ref 80–94)
MONOCYTES # BLD: 1.2 /CUMM (ref 0.1–0.6)
PLATELET # BLD: 247 /CUMM (ref 130–400)
PMV BLD AUTO: 9 FL (ref 7.4–10.4)
RED BLOOD CELL CT: 4.35 /CUMM (ref 4.7–6.1)
WBC # BLD AUTO: 13.6 /CUMM (ref 4.8–10.8)

## 2018-09-18 NOTE — PN- HOUSESTAFF
Andrew Johnson 18 0738:
Subjective
Follow-up For:
Hyponatremia s/p hypertonic saline admin
Opioid dependence on methadone
Subjective:
Afebrile overnight. Patient is seen and examined this morning. Patient states he
is feeling much better today and is ready to go home. Patient has been 
ambulating without difficulty. Patient also reports his abdominal pain has 
resolved from prior. Patient deniesa any n/v, diarrhea, chest pain, and 
shortness of breath. Patient is adamant he would like to go home today.
 
Review of Systems
Constitutional:
Reports: see HPI. 
 
Objective
Last 24 Hrs of Vital Signs/I&O
 Vital Signs
 
 
Date Time Temp Pulse Resp B/P B/P Pulse O2 O2 Flow FiO2
 
     Mean Ox Delivery Rate 
 
 0806  65  138/84     
 
 0806  65  138/84     
 
 0652 98.5 48 18 122/80  97 Room Air  
 
 2148 98.8 55 16 130/70  98 Room Air  
 
 1454 97.7 63 16 130/80  97 Room Air  
 
 0845  65  128/84     
 
 0845  65  128/84     
 
 
 Intake & Output
 
 
  1600  0800  0000
 
Intake Total   400
 
Output Total   
 
Balance   400
 
    
 
Intake, Oral   400
 
Patient  196 lb 
 
Weight   
 
 
 
 
Physical Exam
General Appearance: Alert, Oriented X3, Cooperative, No Acute Distress
Skin: No Rashes, No Breakdown
HEENT: Atraumatic
Neck: Supple, No JVD
Cardiovascular: Regular Rate, Normal S1, Normal S2
Lungs: Clear to Auscultation
Abdomen: Soft, No Tenderness
Neurological: Normal Speech
Extremities: No Edema, Normal Pulses
 
Assessment/Plan
Assessment:
63 YO male with a PMH of polysubstance abuse on Methadone 25mg daily, HTN 
brought in by police in altered state, found to have sodium of 118 on admission 
and confusion and aggression, given these findings thought to be symptomatic 
hyponatremia and so brought to ICU for hypertonic saline administration, now 
with sodium of 131 s/p D5W to prevent overcorrection.  He stable and can be 
transferred to telemetry.
 
Infectious Disease:
-Did have leukocytosis on admission trending down, afebrile, likely reactive
-Will monitor off abx; cultures were drawn on admission; no growths as of yet
 
Metabolic: hypotonic hyponatremia
-Sodium at 133, off d5w
-Nephro recs appreciated
-Tox positive for opiates, methadone, serum alcohol negative on admission
-Restarted on his methadone dose 25mg, called [facility] in 
[place] and spoke with patients nurse who administers his dose; last dose was
on the . Patient had claimed he was taking 45mg of methadone.
-Per [place]PMP, patient has large amount of benzos prescribed (Lorazepam 2mg 120 
pills for 30 days), will monitor for withdrawal
 
Alimentary:
-Regular diet, fluid restricted to 1000 cc; dc'd fluid restriction 
 
Neurologic:
-Intact, on 1:1 sitter; dc'd sitter
-Occasionally agitated, but has not been on restraints today
-Psych consult placed given bizarre patient behavior and patient following 
outpatient psychiatrist, appreciate recs

their assessment on 18 and that can give low-dose antipsychotics if patient
is agitated, QTC is within normal limits;
-Psych reassesed patient on  and determined no acute psychiatric findings at
the present moment.
 
DVT PPX
IV access
Full Code
Problem List:
 1. Hyponatremia
 
 2. Altered mental status
 
Pain Ratin
Pain Location:
na
Pain Goal: Remain pain free
Pain Plan:
na
Tomorrow's Labs & Rationales:
routine
 
 
Rufino Dodd MD 18 1049:
Attending MD Review Statement
 
Attending Statement
Attending MD Statement: examined this patient, discuss w/resident/PA/NP, agreed 
w/resident/PA/NP, reviewed EMR data (avail)
Attending Assessment/Plan:
62M PMH HTN, history of drug abuse on Methadone, admitted with hyponatremia and 
altered mental status.  Sodium has steadily improved initially with fluid 
restriction and food, now off of fluid restriction and doing well.  Patient is 
very eager to go home.  He is not confused and has insight, A&Ox3.  He has no 
physical complaints.  On discussion of discharge planning, he reports he left 
his car at the [employer] parking lot, and will have to go find it.  Plans 
will be made to have a taxi take him to his home, and he and his brother will go
look for his car later on.  He feels safe with this discharge plan.  
 
Plan
- Stable for discharge
- Team spoke with psychiatry who report no further psychiatric intervention at 
this time and will be seen as outpatient
- Continue home medications
- Outpatient PCP follow up for sodium

## 2018-09-18 NOTE — PATIENT DISCHARGE INSTRUCTIONS
Discharge Instructions
 
General Discharge Information
You were seen/treated for:
Low Sodium Levels
Altered Mental Status
You had these procedures:
CXR
Head CT
Watch for these problems:
If you experience any worsening confusion, dizziness, lightheadedness, fevers, 
chills, and or fatigue please follow up with your PCP.
Special Instructions:
Please follow up with a PCP within one week.
Please follow up with a Nephrologist within one week.
Please follow up with a Psychiatrist if you experience any issues with anxiety 
or agitation.
Please continue to take your home medications.
 
Diet
Continue normal diet: Yes
Recommended Diet: Regular
 
Activity
Full Activity/No Limits: No
Activity Self Limited: Yes
 
Acute Coronary Syndrome
 
Inclusion Criteria
At DC or during hospital stay patient has or had the following:
ACS DIAGNOSIS No
 
Discharge Core Measures
Meds if any: Prescribed or Continued at Discharge
Meds if any: NOT Prescribed or Continued at Discharge
 
Congestive Heart Failure
 
Inclusion Criteria
At DC or during hospital stay patient has or had the following:
CHF DIAGNOSIS No
 
Discharge Core Measures
Meds if any: Prescribed or Continued at Discharge
Meds if any: NOT Prescribed or Continued at Discharge
 
Cerebrovascular accident
 
Inclusion Criteria
At DC or during hospital stay patient has or had the following:
CVA/TIA Diagnosis No
 
Discharge Core Measures
Meds if any: Prescribed or Continued at Discharge
Meds if any: NOT Prescribed or Continued at Discharge
 
Venous thromboembolism
 
Inclusion Criteria
VTE Diagnosis No
VTE Type NONE
VTE Confirmed by (Test) NONE
 
Discharge Core Measures
- Per Current guidelines, there needs to be overlap
- treatment for the first 5 days of Warfarin therapy.
- If discharged on Warfarin prior to 5 days of
- overlap therapy, the patient will need to be
- assessed for post discharge needs including
- *Post discharge parental anticoagulation
- *Warfarin and/or parental anticoagulation education
- *Follow up date to check INR post discharge
At least 5 days overlap therapy as Inpatient No
Meds if any: Prescribed or Continued at Discharge
Note: Overlap Therapy is Warfarin and Anticoagulant
Meds if any: NOT Prescribed or Continued at Discharge

## 2018-09-18 NOTE — PN- PSYCHIATRY
Assessment/Plan
Impression:
Pt no longer delirious.  Alert and oriented x 3, gait steady.  Able to recount 
events prior to admission.  Medical team has cleared him for discharge.  Not 
suicidal, homicidal, psychotic or gravely disabled.
Pt states that he lives with his brother but that his brother has not  been to 
visit them as "we don't get along".  Anxious for discharge as "I want to have a 
good shower and shave".
Medical team concerned about psychosocial situation.  S/B Dr Servin earlier in 
admission who recommended getting collateral history.
Suggestion:
 - No acute psychiatric findings currently
 - Suggets pursue collateral from PCP/brother prior to discharge.
 
PLease contact us if we can be of any further assistance.
 
 
Subjective
Subjective:
"I just want to go home, I have been here since Thursday"
 
Objective
Last 24 Hrs of Vital Signs/I&O
 Vital Signs
 
 
Date Time Temp Pulse Resp B/P B/P Pulse O2 O2 Flow FiO2
 
     Mean Ox Delivery Rate 
 
09/18 0806  65  138/84     
 
09/18 0806  65  138/84     
 
09/18 0652 98.5 48 18 122/80  97 Room Air  
 
09/17 2148 98.8 55 16 130/70  98 Room Air  
 
09/17 1454 97.7 63 16 130/80  97 Room Air  
 
 
 Intake & Output
 
 
 09/18 1600 09/18 0800 09/18 0000
 
Intake Total  500 400
 
Output Total   
 
Balance  500 400
 
    
 
Intake, Oral  500 400
 
Patient  88.904 kg 
 
Weight

## 2018-09-18 NOTE — DISCHARGE SUMMARY
Visit Information
 
Visit Dates
Admission Date:
09/13/18
 
Discharge Date:
9/18/18
 
 
Hospital Course
 
Course
Attending Physician:
Rufino Dodd MD
 
Primary Care Physician:
Raj DAVILA, [facility] Course:
62-year-old gentleman past medical history significant for hypertension, 
substance abuse disorder on methadone brought in altered by police.  In ED was 
found to be hyponatremic to 118 with leukocytosis of 17.5. U tox significant for
opioids and methadone. CT head shows no acute intracranial hemorrhage or 
abnormal extra-axial collection. No intracranial mass effect or midline shift.  
Chest x-ray unremarkable
 
1. Hyponatremia and Altered Mental Status
-Patient was admitted to ICU initially for hypertonic saline administration 
given altered mental status and low sodium, however patient already received 
fluid in the ED. Overnight patient was agitated, attempted to leave; order #7 
was called and patient was escorted back to bed, 1:1 sitter ordered. Was in 
restraints in ED, off restraints HD#2. Patient has poor insight into diagnosis, 
continued to ask why he is here and saying he wanted to leave. States that he 
was in [facility] in the beginning of the week for "appendicitis" 
although did not have an operation, was "unable to eat or drink anything" for 
two days and was discharged on Tuesday without follow up or medications. States 
that prior to admission he was in a hotel with his girlfriend and apparently she
wanted pizza, so he went out in his boxers without shoes on to get pizza. Unsure
as to why police found him rummaging through cars, states that "that never 
happened". However, Per discussion with nursing, patient states that "I was not 
rummaging through cars, I just got into the wrong subaru". His sodium increased 
from 118-128, and was placed on D5W to avoid overcorrection. He was started on a
diet with fluid restriction 1000cc/hr. Psychiatry assessed patient and 
determined he had no capacity to leave AMA. Overnight his sodium normalized, was
taken off D5 and was stable from ICU perspective. He did have asymptomatic 
bradycardia, and was transferred to Telemetry for further monitoring. Patient's 
sodium improved to 133 on 09/18. Patient was stable and ready to discharge. 
Patient to follow up with his PCP and nephrology as an outpatient
Allergies:
Coded Allergies:
No Known Allergies (09/13/18)
 
 
Disposition Summary
 
Disposition
Principal Diagnosis:
Hyponatremia
Altered Mental Status
Additional Diagnosis:
Hx. Substance Abuse Disorder
Discharge Disposition: home or self care
 
Discharge Instructions
 
General Discharge Information
Code Status: Full Code
Patient's Diet:
Regular diet
Patient's Activity:
Ad aileen
Follow-Up Instructions/Appts:
Please follow up with a PCP within one week.
Please continue to take your home medications.
 
Medications at Discharge
Discharge Medications:
Continue taking these medications:
Metoprolol Succ XL (Toprol XL) 100 MG TAB.ER.24H
    1 Tablet ORAL DAILY
    Comments:
       Last Taken: 09/18/18
             Time: 08:06
 
Simethicone (Gas-X) 125 MG CAPSULE
    1 Capsule ORAL As Directed as needed for GAS
    Comments:
       NOT GIVEN IN THE HOSPITAL
 
Finasteride (Finasteride) 5 MG TABLET
    1 Tablet ORAL DAILY
    Comments:
       NOT GIVEN IN THE HOSPITAL. PT RECIEVED FLOMAX 09/18/18 @08:06
 
Bupropion HCl (Bupropion XL) 300 MG TAB.ER.24H
    1 Tablet ORAL DAILY
    Comments:
       Last Taken: 09/18/18
             Time: 08:06
 
Naproxen Sodium (Aleve) 220 MG TABLET
     Tablet ORAL As Directed
    Comments:
       NOT GIVEN IN THE HOSPITAL
 
Aspirin/Acetaminophen/Caffeine (Excedrin Extra Strength Caplet) 250 MG-250 MG-65
MG TABLET
     Capsule ORAL As Directed
    Comments:
       NOT GIVEN IN THE HOSPITAL
 
Tamsulosin HCl (Flomax) 0.4 MG CAP.ER.24H
    1 Capsule ORAL DAILY
    Comments:
       Last Taken: 09/18/18
             Time: 08:06
 
 
Copies To:
Raj DAVILA,Dae Villanueva MD Review Statement
Documenting Attending:
Rufino Dodd MD